# Patient Record
Sex: MALE | Race: WHITE | Employment: FULL TIME | URBAN - METROPOLITAN AREA
[De-identification: names, ages, dates, MRNs, and addresses within clinical notes are randomized per-mention and may not be internally consistent; named-entity substitution may affect disease eponyms.]

---

## 2019-11-12 ENCOUNTER — APPOINTMENT (OUTPATIENT)
Dept: CT IMAGING | Age: 44
DRG: 871 | End: 2019-11-12
Attending: EMERGENCY MEDICINE
Payer: COMMERCIAL

## 2019-11-12 ENCOUNTER — APPOINTMENT (OUTPATIENT)
Dept: GENERAL RADIOLOGY | Age: 44
DRG: 871 | End: 2019-11-12
Attending: EMERGENCY MEDICINE
Payer: COMMERCIAL

## 2019-11-12 ENCOUNTER — HOSPITAL ENCOUNTER (INPATIENT)
Age: 44
LOS: 4 days | Discharge: HOME | DRG: 871 | End: 2019-11-16
Attending: EMERGENCY MEDICINE | Admitting: FAMILY MEDICINE
Payer: COMMERCIAL

## 2019-11-12 DIAGNOSIS — A41.9 SEPSIS, DUE TO UNSPECIFIED ORGANISM, UNSPECIFIED WHETHER ACUTE ORGAN DYSFUNCTION PRESENT (HCC): ICD-10-CM

## 2019-11-12 DIAGNOSIS — J96.01 ACUTE RESPIRATORY FAILURE WITH HYPOXIA AND HYPERCAPNIA (HCC): Primary | ICD-10-CM

## 2019-11-12 DIAGNOSIS — K56.601 COMPLETE INTESTINAL OBSTRUCTION, UNSPECIFIED CAUSE (HCC): ICD-10-CM

## 2019-11-12 DIAGNOSIS — J96.02 ACUTE RESPIRATORY FAILURE WITH HYPOXIA AND HYPERCAPNIA (HCC): Primary | ICD-10-CM

## 2019-11-12 PROBLEM — K56.609 SMALL BOWEL OBSTRUCTION (HCC): Status: ACTIVE | Noted: 2019-11-12

## 2019-11-12 LAB
ALBUMIN SERPL-MCNC: 3.9 G/DL (ref 3.5–5)
ALBUMIN/GLOB SERPL: 1.1 {RATIO} (ref 1.1–2.2)
ALP SERPL-CCNC: 78 U/L (ref 45–117)
ALT SERPL-CCNC: 27 U/L (ref 12–78)
AMORPH CRY URNS QL MICRO: ABNORMAL
AMPHET UR QL SCN: POSITIVE
ANION GAP SERPL CALC-SCNC: 12 MMOL/L (ref 5–15)
ANION GAP SERPL CALC-SCNC: 3 MMOL/L (ref 5–15)
APPEARANCE UR: ABNORMAL
ARTERIAL PATENCY WRIST A: ABNORMAL
ARTERIAL PATENCY WRIST A: YES
AST SERPL-CCNC: 22 U/L (ref 15–37)
ATRIAL RATE: 78 BPM
BACTERIA URNS QL MICRO: NEGATIVE /HPF
BARBITURATES UR QL SCN: NEGATIVE
BASE DEFICIT BLDA-SCNC: 10.9 MMOL/L
BASE DEFICIT BLDA-SCNC: 14.3 MMOL/L
BASE DEFICIT BLDA-SCNC: 7 MMOL/L
BASE DEFICIT BLDA-SCNC: 7.8 MMOL/L
BASE DEFICIT BLDA-SCNC: 8.1 MMOL/L
BASOPHILS # BLD: 0.1 K/UL (ref 0–0.1)
BASOPHILS NFR BLD: 1 % (ref 0–1)
BDY SITE: ABNORMAL
BENZODIAZ UR QL: NEGATIVE
BILIRUB SERPL-MCNC: 0.4 MG/DL (ref 0.2–1)
BILIRUB UR QL: NEGATIVE
BUN BLD-MCNC: 20 MG/DL (ref 9–20)
BUN SERPL-MCNC: 16 MG/DL (ref 6–20)
BUN SERPL-MCNC: 16 MG/DL (ref 6–20)
BUN/CREAT SERPL: 10 (ref 12–20)
BUN/CREAT SERPL: 18 (ref 12–20)
CA-I BLD-MCNC: 1.22 MMOL/L (ref 1.12–1.32)
CALCIUM SERPL-MCNC: 6.9 MG/DL (ref 8.5–10.1)
CALCIUM SERPL-MCNC: 8.5 MG/DL (ref 8.5–10.1)
CALCULATED P AXIS, ECG09: 84 DEGREES
CALCULATED R AXIS, ECG10: 41 DEGREES
CALCULATED T AXIS, ECG11: 39 DEGREES
CANNABINOIDS UR QL SCN: POSITIVE
CHLORIDE BLD-SCNC: 99 MMOL/L (ref 98–107)
CHLORIDE SERPL-SCNC: 102 MMOL/L (ref 97–108)
CHLORIDE SERPL-SCNC: 115 MMOL/L (ref 97–108)
CO2 SERPL-SCNC: 22 MMOL/L (ref 21–32)
CO2 SERPL-SCNC: 24 MMOL/L (ref 21–32)
COCAINE UR QL SCN: POSITIVE
COLOR UR: ABNORMAL
COMMENT, HOLDF: NORMAL
CREAT BLD-MCNC: 1.4 MG/DL (ref 0.6–1.3)
CREAT SERPL-MCNC: 0.9 MG/DL (ref 0.7–1.3)
CREAT SERPL-MCNC: 1.64 MG/DL (ref 0.7–1.3)
D DIMER PPP FEU-MCNC: 0.33 MG/L FEU (ref 0–0.65)
DIAGNOSIS, 93000: NORMAL
DIFFERENTIAL METHOD BLD: ABNORMAL
DRUG SCRN COMMENT,DRGCM: ABNORMAL
EOSINOPHIL # BLD: 0.2 K/UL (ref 0–0.4)
EOSINOPHIL NFR BLD: 1 % (ref 0–7)
EPAP/CPAP/PEEP, PAPEEP: 5
EPAP/CPAP/PEEP, PAPEEP: 5
EPAP/CPAP/PEEP, PAPEEP: 8
EPITH CASTS URNS QL MICRO: ABNORMAL /LPF
ERYTHROCYTE [DISTWIDTH] IN BLOOD BY AUTOMATED COUNT: 13.2 % (ref 11.5–14.5)
EST. AVERAGE GLUCOSE BLD GHB EST-MCNC: 108 MG/DL
ETHANOL SERPL-MCNC: <10 MG/DL
FIO2 ON VENT: 100 %
FIO2 ON VENT: 100 %
FIO2 ON VENT: 50 %
GAS FLOW.O2 SETTING OXYMISER: 16 L/MIN
GAS FLOW.O2 SETTING OXYMISER: 16 L/MIN
GAS FLOW.O2 SETTING OXYMISER: 26 L/MIN
GLOBULIN SER CALC-MCNC: 3.7 G/DL (ref 2–4)
GLUCOSE BLD STRIP.AUTO-MCNC: 109 MG/DL (ref 65–100)
GLUCOSE BLD STRIP.AUTO-MCNC: 119 MG/DL (ref 65–100)
GLUCOSE BLD STRIP.AUTO-MCNC: 121 MG/DL (ref 65–100)
GLUCOSE BLD-MCNC: 484 MG/DL (ref 65–100)
GLUCOSE SERPL-MCNC: 105 MG/DL (ref 65–100)
GLUCOSE SERPL-MCNC: 476 MG/DL (ref 65–100)
GLUCOSE UR STRIP.AUTO-MCNC: >1000 MG/DL
HBA1C MFR BLD: 5.4 % (ref 4.2–6.3)
HCO3 BLDA-SCNC: 19 MMOL/L (ref 22–26)
HCO3 BLDA-SCNC: 20 MMOL/L (ref 22–26)
HCO3 BLDA-SCNC: 20 MMOL/L (ref 22–26)
HCO3 BLDA-SCNC: 21 MMOL/L (ref 22–26)
HCO3 BLDA-SCNC: 22 MMOL/L (ref 22–26)
HCT VFR BLD AUTO: 47.8 % (ref 36.6–50.3)
HCT VFR BLD CALC: 48 % (ref 36.6–50.3)
HGB BLD-MCNC: 15 G/DL (ref 12.1–17)
HGB UR QL STRIP: ABNORMAL
HYALINE CASTS URNS QL MICRO: ABNORMAL /LPF (ref 0–5)
IMM GRANULOCYTES # BLD AUTO: 0.1 K/UL (ref 0–0.04)
IMM GRANULOCYTES NFR BLD AUTO: 1 % (ref 0–0.5)
KETONES UR QL STRIP.AUTO: NEGATIVE MG/DL
LACTATE BLD-SCNC: 1.99 MMOL/L (ref 0.4–2)
LACTATE BLD-SCNC: 5.24 MMOL/L (ref 0.4–2)
LEUKOCYTE ESTERASE UR QL STRIP.AUTO: NEGATIVE
LYMPHOCYTES # BLD: 6 K/UL (ref 0.8–3.5)
LYMPHOCYTES NFR BLD: 35 % (ref 12–49)
MAGNESIUM SERPL-MCNC: 2.2 MG/DL (ref 1.6–2.4)
MAGNESIUM SERPL-MCNC: 2.8 MG/DL (ref 1.6–2.4)
MCH RBC QN AUTO: 31.3 PG (ref 26–34)
MCHC RBC AUTO-ENTMCNC: 31.4 G/DL (ref 30–36.5)
MCV RBC AUTO: 99.6 FL (ref 80–99)
METHADONE UR QL: NEGATIVE
MONOCYTES # BLD: 1.2 K/UL (ref 0–1)
MONOCYTES NFR BLD: 7 % (ref 5–13)
NEUTS SEG # BLD: 9.5 K/UL (ref 1.8–8)
NEUTS SEG NFR BLD: 55 % (ref 32–75)
NITRITE UR QL STRIP.AUTO: NEGATIVE
NRBC # BLD: 0 K/UL (ref 0–0.01)
NRBC BLD-RTO: 0 PER 100 WBC
OPIATES UR QL: NEGATIVE
P-R INTERVAL, ECG05: 146 MS
PCO2 BLDA: 44 MMHG (ref 35–45)
PCO2 BLDA: 47 MMHG (ref 35–45)
PCO2 BLDA: 53 MMHG (ref 35–45)
PCO2 BLDA: 82 MMHG (ref 35–45)
PCO2 BLDA: 99 MMHG (ref 35–45)
PCP UR QL: NEGATIVE
PH BLDA: 6.93 [PH] (ref 7.35–7.45)
PH BLDA: 7.04 [PH] (ref 7.35–7.45)
PH BLDA: 7.21 [PH] (ref 7.35–7.45)
PH BLDA: 7.24 [PH] (ref 7.35–7.45)
PH BLDA: 7.27 [PH] (ref 7.35–7.45)
PH UR STRIP: 5.5 [PH] (ref 5–8)
PHOSPHATE SERPL-MCNC: 2.6 MG/DL (ref 2.6–4.7)
PLATELET # BLD AUTO: 423 K/UL (ref 150–400)
PMV BLD AUTO: 9.8 FL (ref 8.9–12.9)
PO2 BLDA: 110 MMHG (ref 80–100)
PO2 BLDA: 139 MMHG (ref 80–100)
PO2 BLDA: 67 MMHG (ref 80–100)
PO2 BLDA: 82 MMHG (ref 80–100)
PO2 BLDA: 90 MMHG (ref 80–100)
POTASSIUM BLD-SCNC: 3.7 MMOL/L (ref 3.5–5.1)
POTASSIUM SERPL-SCNC: 3.7 MMOL/L (ref 3.5–5.1)
POTASSIUM SERPL-SCNC: 4 MMOL/L (ref 3.5–5.1)
PRESSURE SUPPORT SETTING VENT: 5 CM[H2O]
PRESSURE SUPPORT SETTING VENT: 5 CM[H2O]
PROCALCITONIN SERPL-MCNC: <0.1 NG/ML
PROT SERPL-MCNC: 7.6 G/DL (ref 6.4–8.2)
PROT UR STRIP-MCNC: 100 MG/DL
Q-T INTERVAL, ECG07: 374 MS
QRS DURATION, ECG06: 94 MS
QTC CALCULATION (BEZET), ECG08: 426 MS
RBC # BLD AUTO: 4.8 M/UL (ref 4.1–5.7)
RBC #/AREA URNS HPF: ABNORMAL /HPF (ref 0–5)
SAMPLES BEING HELD,HOLD: NORMAL
SAO2 % BLD: 86 % (ref 92–97)
SAO2 % BLD: 89 % (ref 92–97)
SAO2 % BLD: 92 % (ref 92–97)
SAO2 % BLD: 97 % (ref 92–97)
SAO2 % BLD: 98 % (ref 92–97)
SAO2% DEVICE SAO2% SENSOR NAME: ABNORMAL
SERVICE CMNT-IMP: ABNORMAL
SODIUM BLD-SCNC: 137 MMOL/L (ref 136–145)
SODIUM SERPL-SCNC: 136 MMOL/L (ref 136–145)
SODIUM SERPL-SCNC: 142 MMOL/L (ref 136–145)
SP GR UR REFRACTOMETRY: 1.02 (ref 1–1.03)
SPECIMEN SITE: ABNORMAL
SPERM URNS QL MICRO: PRESENT
TROPONIN I SERPL-MCNC: <0.05 NG/ML
UR CULT HOLD, URHOLD: NORMAL
UROBILINOGEN UR QL STRIP.AUTO: 0.2 EU/DL (ref 0.2–1)
VENTILATION MODE VENT: ABNORMAL
VENTRICULAR RATE, ECG03: 78 BPM
VT SETTING VENT: 450 ML
WBC # BLD AUTO: 17.1 K/UL (ref 4.1–11.1)
WBC URNS QL MICRO: ABNORMAL /HPF (ref 0–4)

## 2019-11-12 PROCEDURE — 84484 ASSAY OF TROPONIN QUANT: CPT

## 2019-11-12 PROCEDURE — 80053 COMPREHEN METABOLIC PANEL: CPT

## 2019-11-12 PROCEDURE — 96368 THER/DIAG CONCURRENT INF: CPT

## 2019-11-12 PROCEDURE — 80307 DRUG TEST PRSMV CHEM ANLYZR: CPT

## 2019-11-12 PROCEDURE — 80047 BASIC METABLC PNL IONIZED CA: CPT

## 2019-11-12 PROCEDURE — 74011250636 HC RX REV CODE- 250/636: Performed by: FAMILY MEDICINE

## 2019-11-12 PROCEDURE — 94640 AIRWAY INHALATION TREATMENT: CPT

## 2019-11-12 PROCEDURE — 81001 URINALYSIS AUTO W/SCOPE: CPT

## 2019-11-12 PROCEDURE — 65610000006 HC RM INTENSIVE CARE

## 2019-11-12 PROCEDURE — 96365 THER/PROPH/DIAG IV INF INIT: CPT

## 2019-11-12 PROCEDURE — 83735 ASSAY OF MAGNESIUM: CPT

## 2019-11-12 PROCEDURE — 74011250636 HC RX REV CODE- 250/636

## 2019-11-12 PROCEDURE — 94760 N-INVAS EAR/PLS OXIMETRY 1: CPT

## 2019-11-12 PROCEDURE — 94002 VENT MGMT INPAT INIT DAY: CPT

## 2019-11-12 PROCEDURE — 74011250637 HC RX REV CODE- 250/637: Performed by: INTERNAL MEDICINE

## 2019-11-12 PROCEDURE — 71260 CT THORAX DX C+: CPT

## 2019-11-12 PROCEDURE — 82803 BLOOD GASES ANY COMBINATION: CPT

## 2019-11-12 PROCEDURE — 74011250636 HC RX REV CODE- 250/636: Performed by: INTERNAL MEDICINE

## 2019-11-12 PROCEDURE — 85025 COMPLETE CBC W/AUTO DIFF WBC: CPT

## 2019-11-12 PROCEDURE — 77010033678 HC OXYGEN DAILY

## 2019-11-12 PROCEDURE — 70450 CT HEAD/BRAIN W/O DYE: CPT

## 2019-11-12 PROCEDURE — 84145 PROCALCITONIN (PCT): CPT

## 2019-11-12 PROCEDURE — 83036 HEMOGLOBIN GLYCOSYLATED A1C: CPT

## 2019-11-12 PROCEDURE — 74011250636 HC RX REV CODE- 250/636: Performed by: EMERGENCY MEDICINE

## 2019-11-12 PROCEDURE — 96375 TX/PRO/DX INJ NEW DRUG ADDON: CPT

## 2019-11-12 PROCEDURE — 82962 GLUCOSE BLOOD TEST: CPT

## 2019-11-12 PROCEDURE — 71045 X-RAY EXAM CHEST 1 VIEW: CPT

## 2019-11-12 PROCEDURE — 74011636320 HC RX REV CODE- 636/320: Performed by: RADIOLOGY

## 2019-11-12 PROCEDURE — 93005 ELECTROCARDIOGRAM TRACING: CPT

## 2019-11-12 PROCEDURE — 43753 TX GASTRO INTUB W/ASP: CPT

## 2019-11-12 PROCEDURE — 31500 INSERT EMERGENCY AIRWAY: CPT

## 2019-11-12 PROCEDURE — 96366 THER/PROPH/DIAG IV INF ADDON: CPT

## 2019-11-12 PROCEDURE — 74011000250 HC RX REV CODE- 250: Performed by: INTERNAL MEDICINE

## 2019-11-12 PROCEDURE — 99285 EMERGENCY DEPT VISIT HI MDM: CPT

## 2019-11-12 PROCEDURE — 87040 BLOOD CULTURE FOR BACTERIA: CPT

## 2019-11-12 PROCEDURE — 94003 VENT MGMT INPAT SUBQ DAY: CPT

## 2019-11-12 PROCEDURE — 77030013140 HC MSK NEB VYRM -A

## 2019-11-12 PROCEDURE — 84100 ASSAY OF PHOSPHORUS: CPT

## 2019-11-12 PROCEDURE — 5A1935Z RESPIRATORY VENTILATION, LESS THAN 24 CONSECUTIVE HOURS: ICD-10-PCS | Performed by: EMERGENCY MEDICINE

## 2019-11-12 PROCEDURE — 36600 WITHDRAWAL OF ARTERIAL BLOOD: CPT

## 2019-11-12 PROCEDURE — 51702 INSERT TEMP BLADDER CATH: CPT

## 2019-11-12 PROCEDURE — 36415 COLL VENOUS BLD VENIPUNCTURE: CPT

## 2019-11-12 PROCEDURE — 74011000250 HC RX REV CODE- 250: Performed by: EMERGENCY MEDICINE

## 2019-11-12 PROCEDURE — 0BH17EZ INSERTION OF ENDOTRACHEAL AIRWAY INTO TRACHEA, VIA NATURAL OR ARTIFICIAL OPENING: ICD-10-PCS | Performed by: EMERGENCY MEDICINE

## 2019-11-12 PROCEDURE — 83605 ASSAY OF LACTIC ACID: CPT

## 2019-11-12 PROCEDURE — 85379 FIBRIN DEGRADATION QUANT: CPT

## 2019-11-12 PROCEDURE — 74011000258 HC RX REV CODE- 258: Performed by: FAMILY MEDICINE

## 2019-11-12 RX ORDER — INSULIN LISPRO 100 [IU]/ML
INJECTION, SOLUTION INTRAVENOUS; SUBCUTANEOUS
Status: DISCONTINUED | OUTPATIENT
Start: 2019-11-12 | End: 2019-11-12

## 2019-11-12 RX ORDER — IPRATROPIUM BROMIDE AND ALBUTEROL SULFATE 2.5; .5 MG/3ML; MG/3ML
SOLUTION RESPIRATORY (INHALATION)
Status: DISCONTINUED
Start: 2019-11-12 | End: 2019-11-12

## 2019-11-12 RX ORDER — FENTANYL CITRATE 50 UG/ML
50 INJECTION, SOLUTION INTRAMUSCULAR; INTRAVENOUS
Status: COMPLETED | OUTPATIENT
Start: 2019-11-12 | End: 2019-11-12

## 2019-11-12 RX ORDER — PROPOFOL 10 MG/ML
0-50 VIAL (ML) INTRAVENOUS
Status: DISCONTINUED | OUTPATIENT
Start: 2019-11-12 | End: 2019-11-13

## 2019-11-12 RX ORDER — ESCITALOPRAM OXALATE 20 MG/1
20 TABLET ORAL DAILY
COMMUNITY

## 2019-11-12 RX ORDER — ENOXAPARIN SODIUM 100 MG/ML
40 INJECTION SUBCUTANEOUS EVERY 24 HOURS
Status: DISCONTINUED | OUTPATIENT
Start: 2019-11-12 | End: 2019-11-16 | Stop reason: HOSPADM

## 2019-11-12 RX ORDER — IPRATROPIUM BROMIDE AND ALBUTEROL SULFATE 2.5; .5 MG/3ML; MG/3ML
3 SOLUTION RESPIRATORY (INHALATION)
Status: DISCONTINUED | OUTPATIENT
Start: 2019-11-12 | End: 2019-11-13

## 2019-11-12 RX ORDER — ONDANSETRON 2 MG/ML
8 INJECTION INTRAMUSCULAR; INTRAVENOUS
Status: COMPLETED | OUTPATIENT
Start: 2019-11-12 | End: 2019-11-12

## 2019-11-12 RX ORDER — SUCCINYLCHOLINE CHLORIDE 20 MG/ML
100 INJECTION INTRAMUSCULAR; INTRAVENOUS
Status: COMPLETED | OUTPATIENT
Start: 2019-11-12 | End: 2019-11-12

## 2019-11-12 RX ORDER — LEVOFLOXACIN 5 MG/ML
750 INJECTION, SOLUTION INTRAVENOUS
Status: COMPLETED | OUTPATIENT
Start: 2019-11-12 | End: 2019-11-12

## 2019-11-12 RX ORDER — SODIUM CHLORIDE 9 MG/ML
125 INJECTION, SOLUTION INTRAVENOUS CONTINUOUS
Status: DISCONTINUED | OUTPATIENT
Start: 2019-11-12 | End: 2019-11-13

## 2019-11-12 RX ORDER — ALBUTEROL SULFATE 0.83 MG/ML
SOLUTION RESPIRATORY (INHALATION)
Status: DISCONTINUED
Start: 2019-11-12 | End: 2019-11-12

## 2019-11-12 RX ORDER — MAGNESIUM SULFATE 100 %
4 CRYSTALS MISCELLANEOUS AS NEEDED
Status: DISCONTINUED | OUTPATIENT
Start: 2019-11-12 | End: 2019-11-13

## 2019-11-12 RX ORDER — DIPHENHYDRAMINE HCL 25 MG
25 TABLET ORAL
COMMUNITY

## 2019-11-12 RX ORDER — DEXTROSE MONOHYDRATE 100 MG/ML
0-250 INJECTION, SOLUTION INTRAVENOUS AS NEEDED
Status: DISCONTINUED | OUTPATIENT
Start: 2019-11-12 | End: 2019-11-13

## 2019-11-12 RX ORDER — QUETIAPINE FUMARATE 25 MG/1
25 TABLET, FILM COATED ORAL
Status: DISCONTINUED | OUTPATIENT
Start: 2019-11-12 | End: 2019-11-14

## 2019-11-12 RX ORDER — MIDAZOLAM HYDROCHLORIDE 1 MG/ML
5 INJECTION, SOLUTION INTRAMUSCULAR; INTRAVENOUS
Status: DISPENSED | OUTPATIENT
Start: 2019-11-12 | End: 2019-11-12

## 2019-11-12 RX ORDER — VANCOMYCIN/0.9 % SOD CHLORIDE 1.5G/250ML
1500 PLASTIC BAG, INJECTION (ML) INTRAVENOUS
Status: DISCONTINUED | OUTPATIENT
Start: 2019-11-12 | End: 2019-11-12

## 2019-11-12 RX ADMIN — FAMOTIDINE 20 MG: 10 INJECTION, SOLUTION INTRAVENOUS at 10:41

## 2019-11-12 RX ADMIN — SODIUM CHLORIDE 1000 ML: 900 INJECTION, SOLUTION INTRAVENOUS at 07:48

## 2019-11-12 RX ADMIN — IPRATROPIUM BROMIDE AND ALBUTEROL SULFATE 3 ML: .5; 3 SOLUTION RESPIRATORY (INHALATION) at 16:37

## 2019-11-12 RX ADMIN — PROPOFOL 50 MCG/KG/MIN: 10 INJECTION, EMULSION INTRAVENOUS at 08:17

## 2019-11-12 RX ADMIN — VANCOMYCIN HYDROCHLORIDE 1250 MG: 1.25 INJECTION, POWDER, LYOPHILIZED, FOR SOLUTION INTRAVENOUS at 07:01

## 2019-11-12 RX ADMIN — QUETIAPINE FUMARATE 25 MG: 25 TABLET ORAL at 18:16

## 2019-11-12 RX ADMIN — FENTANYL CITRATE 50 MCG: 50 INJECTION, SOLUTION INTRAMUSCULAR; INTRAVENOUS at 04:31

## 2019-11-12 RX ADMIN — ONDANSETRON 8 MG: 2 INJECTION INTRAMUSCULAR; INTRAVENOUS at 05:17

## 2019-11-12 RX ADMIN — LEVOFLOXACIN 750 MG: 5 INJECTION, SOLUTION INTRAVENOUS at 05:46

## 2019-11-12 RX ADMIN — METHYLPREDNISOLONE SODIUM SUCCINATE 125 MG: 125 INJECTION, POWDER, FOR SOLUTION INTRAMUSCULAR; INTRAVENOUS at 04:23

## 2019-11-12 RX ADMIN — PROPOFOL 25 MCG/KG/MIN: 10 INJECTION, EMULSION INTRAVENOUS at 04:32

## 2019-11-12 RX ADMIN — SODIUM CHLORIDE 1000 ML: 900 INJECTION, SOLUTION INTRAVENOUS at 04:32

## 2019-11-12 RX ADMIN — SODIUM CHLORIDE 125 ML/HR: 900 INJECTION, SOLUTION INTRAVENOUS at 16:44

## 2019-11-12 RX ADMIN — VANCOMYCIN HYDROCHLORIDE 1000 MG: 1 INJECTION, POWDER, LYOPHILIZED, FOR SOLUTION INTRAVENOUS at 23:19

## 2019-11-12 RX ADMIN — CEFEPIME 2 G: 20 INJECTION, POWDER, FOR SOLUTION INTRAVENOUS at 11:49

## 2019-11-12 RX ADMIN — ALBUTEROL SULFATE 2 DOSE: 2.5 SOLUTION RESPIRATORY (INHALATION) at 04:31

## 2019-11-12 RX ADMIN — VANCOMYCIN HYDROCHLORIDE 1000 MG: 1 INJECTION, POWDER, LYOPHILIZED, FOR SOLUTION INTRAVENOUS at 14:34

## 2019-11-12 RX ADMIN — IPRATROPIUM BROMIDE AND ALBUTEROL SULFATE 3 ML: .5; 3 SOLUTION RESPIRATORY (INHALATION) at 20:10

## 2019-11-12 RX ADMIN — SODIUM CHLORIDE 1000 ML: 900 INJECTION, SOLUTION INTRAVENOUS at 04:31

## 2019-11-12 RX ADMIN — CEFEPIME 2 G: 2 INJECTION, POWDER, FOR SOLUTION INTRAVENOUS at 05:44

## 2019-11-12 RX ADMIN — CEFEPIME 2 G: 20 INJECTION, POWDER, FOR SOLUTION INTRAVENOUS at 20:17

## 2019-11-12 RX ADMIN — FAMOTIDINE 20 MG: 10 INJECTION, SOLUTION INTRAVENOUS at 20:18

## 2019-11-12 RX ADMIN — ENOXAPARIN SODIUM 40 MG: 40 INJECTION SUBCUTANEOUS at 20:18

## 2019-11-12 RX ADMIN — SUCCINYLCHOLINE CHLORIDE 100 MG: 20 INJECTION, SOLUTION INTRAMUSCULAR; INTRAVENOUS; PARENTERAL at 04:12

## 2019-11-12 RX ADMIN — IOPAMIDOL 100 ML: 755 INJECTION, SOLUTION INTRAVENOUS at 05:32

## 2019-11-12 RX ADMIN — IPRATROPIUM BROMIDE AND ALBUTEROL SULFATE 3 ML: .5; 3 SOLUTION RESPIRATORY (INHALATION) at 10:58

## 2019-11-12 RX ADMIN — SODIUM CHLORIDE 125 ML/HR: 900 INJECTION, SOLUTION INTRAVENOUS at 07:45

## 2019-11-12 RX ADMIN — ALBUTEROL SULFATE 2 DOSE: 2.5 SOLUTION RESPIRATORY (INHALATION) at 04:57

## 2019-11-12 NOTE — PROGRESS NOTES
Daily Progress Note: 11/12/2019  Rc Mars MD    Assessment/Plan:   1. Sepsis       - on Cefepime and continue Vancomycin       - check blood cultures and adjust antibiotics accordingly     2. AMS - ? Due to drugs - see UDS       -  Continue neuro checks; currently sedated on Propofol on vent     3. Small bowel obstruction       -  Orogastric tube to wall suction       - NPO/ IVFs       - consulted general surgery     4. Acute respiratory failure with acute uncompensated primary respiratory/ metabolic acidosis       -  Continue ventilator management;  intensivist/ pulmonologist managing      5. Severe metabolic acidosis       - plan as above; ordered IV fluid bolus and maintenance IV infusion     6.  Leukocytosis       -  Repeat CBC     7. Hypothermia       - jaya hugger for external warming and temperature monitoring     8. New onset type 2 diabetes mellitus uncontrolled - with nonketotic hyperosmolar diabetic syndrome       -  Order insulin IV infusion with hourly glucose checks per protocol     9. Lactic acidosis       - continue IV fluid resuscitation     10. Cocaine / Meth/ THC/ polysubstance abuse         - would encourage drug cessation     11. Asthma exacerbation        -  Solumedrol IV and Duoneb treatments per Pul     VTE prophylaxis      - SCDs to BLEs     Problem List:  Problem List as of 11/12/2019 Date Reviewed: 9/3/2014          Codes Class Noted - Resolved    Small bowel obstruction ([facility] [address] ICD-10-CM: Q14.029  ICD-9-CM: 560.9  11/12/2019 - Present        Sepsis ([facility] [address] ICD-10-CM: A41.9  ICD-9-CM: 038.9, 995.91  11/12/2019 - Present              Subjective:     53 y.o. male with past medical history of ADD, Asthma, depression presented to the ED via EMS with reported AMS (altered mental status). Patient is already ETT intubated and was placed on mechanical ventilator support on arrival to the ED this morning.   As such, history was obtained per my review of ED and electronic medical records. Later some additional history was obtained from patient's next of kin- his mother. EMS was reportedly called by patient's girlfriend as she heard patient moaning. Patient reportedly was found on the floor unresponsive. Patient was noted to have coffee ground emesis in his mouth. Patient was reportedly given Ketamine per EMS. He was initially on a 100% non-re breather and then was endotracheally intubated on admission to the ED. Patient is now seen for admission for further evaluation and treatments. Per discussion with patient's mother, she saw the patient yesterday at which time he had no complaints. (Dr Gracia Broussard)    : Intubated and sedated. Discussed with pts parents and they report he did not seem depressed yesterday. Urine Drug screen with cocaine, meth and THC. Review of Systems:   A comprehensive review of systems was negative except for that written in the HPI. Objective:   Physical Exam:     Visit Vitals  /63   Pulse 81   Temp 98.4 °F (36.9 °C)   Resp 11   Ht 5' 10\" (1.778 m)   Wt 70.3 kg (155 lb)   SpO2 96%   BMI 22.24 kg/m²    O2 Flow Rate (L/min): 6 l/min O2 Device: Nasal cannula    Temp (24hrs), Av.7 °F (35.9 °C), Min:94.5 °F (34.7 °C), Max:98.4 °F (36.9 °C)     07 - 1900  In: 7049 [I.V.:1181]  Out: 478 [NYPUS:823]   11/10 1901 -  0700  In: 1000 [I.V.:1000]  Out: -     General:  Intubated/sedated, appears stated age. Head:  Normocephalic, without obvious abnormality, atraumatic. Eyes:  Conjunctivae/corneas clear. EOMs appear intact. Neck: Supple, symmetrical, trachea midline, no adenopathy, thyroid: no enlargement/tenderness/nodules, no carotid bruit and no JVD. Lungs:   rhonchi bilaterally. Chest wall:  No tenderness or deformity. Heart:  Regular rate and rhythm, S1, S2 normal, no murmur, click, rub or gallop. Abdomen:   Soft, non-tender, mildly distended. Bowel sounds normal. No masses,  No organomegaly. Extremities: no cyanosis or edema. No calf tenderness or cords. Pulses: 2+ and symmetric all extremities. Skin: Skin color, texture, turgor normal. No rashes    Neurologic: Intubated and sedated. Data Review:       Recent Days:  Recent Labs     11/12/19  0419   WBC 17.1*   HGB 15.0   HCT 47.8   *     Recent Labs     11/12/19  0903 11/12/19  0419    136   K 4.0 3.7   * 102   CO2 24 22   * 476*   BUN 16 16   CREA 0.90 1.64*   CA 6.9* 8.5   MG 2.2 2.8*   PHOS 2.6  --    ALB  --  3.9   TBILI  --  0.4   SGOT  --  22   ALT  --  27     Recent Labs     11/12/19  1232 11/12/19  1110 11/12/19  0645   PH 7.27* 7.24* 7.21*   PCO2 44 47* 53*   PO2 139* 110* 67*   HCO3 20* 19* 21*   FIO2 50 50 50       24 Hour Results:  Recent Results (from the past 24 hour(s))   POC CHEM8    Collection Time: 11/12/19  4:14 AM   Result Value Ref Range    Calcium, ionized (POC) 1.22 1.12 - 1.32 mmol/L    Sodium (POC) 137 136 - 145 mmol/L    Potassium (POC) 3.7 3.5 - 5.1 mmol/L    Chloride (POC) 99 98 - 107 mmol/L    Glucose (POC) 484 (H) 65 - 100 mg/dL    BUN (POC) 20 9 - 20 mg/dL    Creatinine (POC) 1.4 (H) 0.6 - 1.3 mg/dL    GFRAA, POC >60 >60 ml/min/1.73m2    GFRNA, POC 55 (L) >60 ml/min/1.73m2    Hematocrit (POC) 48 36.6 - 50.3 %    Comment Comment Not Indicated. CBC WITH AUTOMATED DIFF    Collection Time: 11/12/19  4:19 AM   Result Value Ref Range    WBC 17.1 (H) 4.1 - 11.1 K/uL    RBC 4.80 4. 10 - 5.70 M/uL    HGB 15.0 12.1 - 17.0 g/dL    HCT 47.8 36.6 - 50.3 %    MCV 99.6 (H) 80.0 - 99.0 FL    MCH 31.3 26.0 - 34.0 PG    MCHC 31.4 30.0 - 36.5 g/dL    RDW 13.2 11.5 - 14.5 %    PLATELET 731 (H) 348 - 400 K/uL    MPV 9.8 8.9 - 12.9 FL    NRBC 0.0 0  WBC    ABSOLUTE NRBC 0.00 0.00 - 0.01 K/uL    NEUTROPHILS 55 32 - 75 %    LYMPHOCYTES 35 12 - 49 %    MONOCYTES 7 5 - 13 %    EOSINOPHILS 1 0 - 7 %    BASOPHILS 1 0 - 1 %    IMMATURE GRANULOCYTES 1 (H) 0.0 - 0.5 %    ABS.  NEUTROPHILS 9.5 (H) 1.8 - 8.0 K/UL    ABS. LYMPHOCYTES 6.0 (H) 0.8 - 3.5 K/UL    ABS. MONOCYTES 1.2 (H) 0.0 - 1.0 K/UL    ABS. EOSINOPHILS 0.2 0.0 - 0.4 K/UL    ABS. BASOPHILS 0.1 0.0 - 0.1 K/UL    ABS. IMM. GRANS. 0.1 (H) 0.00 - 0.04 K/UL    DF AUTOMATED     METABOLIC PANEL, COMPREHENSIVE    Collection Time: 11/12/19  4:19 AM   Result Value Ref Range    Sodium 136 136 - 145 mmol/L    Potassium 3.7 3.5 - 5.1 mmol/L    Chloride 102 97 - 108 mmol/L    CO2 22 21 - 32 mmol/L    Anion gap 12 5 - 15 mmol/L    Glucose 476 (H) 65 - 100 mg/dL    BUN 16 6 - 20 MG/DL    Creatinine 1.64 (H) 0.70 - 1.30 MG/DL    BUN/Creatinine ratio 10 (L) 12 - 20      GFR est AA 55 (L) >60 ml/min/1.73m2    GFR est non-AA 45 (L) >60 ml/min/1.73m2    Calcium 8.5 8.5 - 10.1 MG/DL    Bilirubin, total 0.4 0.2 - 1.0 MG/DL    ALT (SGPT) 27 12 - 78 U/L    AST (SGOT) 22 15 - 37 U/L    Alk. phosphatase 78 45 - 117 U/L    Protein, total 7.6 6.4 - 8.2 g/dL    Albumin 3.9 3.5 - 5.0 g/dL    Globulin 3.7 2.0 - 4.0 g/dL    A-G Ratio 1.1 1.1 - 2.2     TROPONIN I    Collection Time: 11/12/19  4:19 AM   Result Value Ref Range    Troponin-I, Qt. <0.05 <0.05 ng/mL   D DIMER    Collection Time: 11/12/19  4:19 AM   Result Value Ref Range    D-dimer 0.33 0.00 - 0.65 mg/L FEU   SAMPLES BEING HELD    Collection Time: 11/12/19  4:19 AM   Result Value Ref Range    SAMPLES BEING HELD 1RED,1SST     COMMENT        Add-on orders for these samples will be processed based on acceptable specimen integrity and analyte stability, which may vary by analyte.    ETHYL ALCOHOL    Collection Time: 11/12/19  4:19 AM   Result Value Ref Range    ALCOHOL(ETHYL),SERUM <10 <10 MG/DL   MAGNESIUM    Collection Time: 11/12/19  4:19 AM   Result Value Ref Range    Magnesium 2.8 (H) 1.6 - 2.4 mg/dL   PROCALCITONIN    Collection Time: 11/12/19  4:19 AM   Result Value Ref Range    Procalcitonin <0.1 ng/mL   DRUG SCREEN, URINE    Collection Time: 11/12/19  4:24 AM   Result Value Ref Range    AMPHETAMINES POSITIVE (A) NEG BARBITURATES NEGATIVE  NEG      BENZODIAZEPINES NEGATIVE  NEG      COCAINE POSITIVE (A) NEG      METHADONE NEGATIVE  NEG      OPIATES NEGATIVE  NEG      PCP(PHENCYCLIDINE) NEGATIVE  NEG      THC (TH-CANNABINOL) POSITIVE (A) NEG      Drug screen comment (NOTE)    POC LACTIC ACID    Collection Time: 11/12/19  4:25 AM   Result Value Ref Range    Lactic Acid (POC) 5.24 (HH) 0.40 - 2.00 mmol/L   EKG, 12 LEAD, INITIAL    Collection Time: 11/12/19  4:27 AM   Result Value Ref Range    Ventricular Rate 78 BPM    Atrial Rate 78 BPM    P-R Interval 146 ms    QRS Duration 94 ms    Q-T Interval 374 ms    QTC Calculation (Bezet) 426 ms    Calculated P Axis 84 degrees    Calculated R Axis 41 degrees    Calculated T Axis 39 degrees    Diagnosis       Normal sinus rhythm  Normal ECG  No previous ECGs available  Confirmed by Giovanny Mendoza M.D., Cherise Dai ([id number]) on 11/12/2019 12:17:50 PM     BLOOD GAS, ARTERIAL    Collection Time: 11/12/19  4:31 AM   Result Value Ref Range    pH 6.93 (LL) 7.35 - 7.45      PCO2 99 (H) 35.0 - 45.0 mmHg    PO2 82 80 - 100 mmHg    O2 SAT 86 (L) 92 - 97 %    BICARBONATE 20 (L) 22 - 26 mmol/L    BASE DEFICIT 14.3 mmol/L    O2 METHOD VENTILATOR      FIO2 100 %    MODE Pressure Release Ventilation      Tidal volume 450      SET RATE 16      EPAP/CPAP/PEEP 8.0      Sample source ARTERIAL      SITE RIGHT RADIAL      STEPHANIA'S TEST N/A      Critical value read back SUZANNE ROGERS MD    URINALYSIS W/MICROSCOPIC    Collection Time: 11/12/19  4:38 AM   Result Value Ref Range    Color YELLOW/STRAW      Appearance CLOUDY (A) CLEAR      Specific gravity 1.024 1.003 - 1.030      pH (UA) 5.5 5.0 - 8.0      Protein 100 (A) NEG mg/dL    Glucose >1,000 (A) NEG mg/dL    Ketone NEGATIVE  NEG mg/dL    Bilirubin NEGATIVE  NEG      Blood MODERATE (A) NEG      Urobilinogen 0.2 0.2 - 1.0 EU/dL    Nitrites NEGATIVE  NEG      Leukocyte Esterase NEGATIVE  NEG      WBC 10-20 0 - 4 /hpf    RBC 0-5 0 - 5 /hpf    Epithelial cells FEW FEW /lpf    Bacteria NEGATIVE  NEG /hpf    Amorphous Crystals FEW (A) NEG      Hyaline cast 10-20 0 - 5 /lpf    Spermatozoa PRESENT     URINE CULTURE HOLD SAMPLE    Collection Time: 11/12/19  4:38 AM   Result Value Ref Range    Urine culture hold        URINE ON HOLD IN MICROBIOLOGY DEPT FOR 3 DAYS. IF UNPRESERVED URINE IS SUBMITTED, IT CANNOT BE USED FOR ADDITIONAL TESTING AFTER 24 HRS, RECOLLECTION WILL BE REQUIRED. BLOOD GAS, ARTERIAL    Collection Time: 11/12/19  4:55 AM   Result Value Ref Range    pH 7.04 (LL) 7.35 - 7.45      PCO2 82 (H) 35.0 - 45.0 mmHg    PO2 90 80 - 100 mmHg    O2 SAT 92 92 - 97 %    BICARBONATE 22 22 - 26 mmol/L    BASE DEFICIT 10.9 mmol/L    O2 METHOD VENTILATOR      FIO2 100 %    MODE Pressure Release Ventilation      Tidal volume 450      SET RATE 26      EPAP/CPAP/PEEP 8.0      Sample source ARTERIAL      SITE RIGHT BRACHIAL      STEPHANIA'S TEST N/A      Critical value read back SUZANNE ROGERS MD    POC LACTIC ACID    Collection Time: 11/12/19  6:42 AM   Result Value Ref Range    Lactic Acid (POC) 1.99 0.40 - 2.00 mmol/L   BLOOD GAS, ARTERIAL    Collection Time: 11/12/19  6:45 AM   Result Value Ref Range    pH 7.21 (LL) 7.35 - 7.45      PCO2 53 (H) 35.0 - 45.0 mmHg    PO2 67 (L) 80 - 100 mmHg    O2 SAT 89 (L) 92 - 97 %    BICARBONATE 21 (L) 22 - 26 mmol/L    BASE DEFICIT 7.8 mmol/L    O2 METHOD VENTILATOR      FIO2 50 %    MODE Pressure Release Ventilation      Tidal volume 450      SET RATE 16      EPAP/CPAP/PEEP 8.0      Sample source ARTERIAL      SITE RIGHT BRACHIAL      STEPHANIA'S TEST N/A      Critical value read back SUZANNE ROGERS MD    GLUCOSE, POC    Collection Time: 11/12/19  7:59 AM   Result Value Ref Range    Glucose (POC) 119 (H) 65 - 100 mg/dL    Performed by [address] [facility] [address] , BASIC    Collection Time: 11/12/19  9:03 AM   Result Value Ref Range    Sodium 142 136 - 145 mmol/L    Potassium 4.0 3.5 - 5.1 mmol/L    Chloride 115 (H) 97 - 108 mmol/L    CO2 24 21 - 32 mmol/L    Anion gap 3 (L) 5 - 15 mmol/L    Glucose 105 (H) 65 - 100 mg/dL    BUN 16 6 - 20 MG/DL    Creatinine 0.90 0.70 - 1.30 MG/DL    BUN/Creatinine ratio 18 12 - 20      GFR est AA >60 >60 ml/min/1.73m2    GFR est non-AA >60 >60 ml/min/1.73m2    Calcium 6.9 (L) 8.5 - 10.1 MG/DL   MAGNESIUM    Collection Time: 11/12/19  9:03 AM   Result Value Ref Range    Magnesium 2.2 1.6 - 2.4 mg/dL   PHOSPHORUS    Collection Time: 11/12/19  9:03 AM   Result Value Ref Range    Phosphorus 2.6 2.6 - 4.7 MG/DL   BLOOD GAS, ARTERIAL    Collection Time: 11/12/19 11:10 AM   Result Value Ref Range    pH 7.24 (LL) 7.35 - 7.45      PCO2 47 (H) 35.0 - 45.0 mmHg    PO2 110 (H) 80 - 100 mmHg    O2 SAT 97 92 - 97 %    BICARBONATE 19 (L) 22 - 26 mmol/L    BASE DEFICIT 8.1 mmol/L    O2 METHOD VENTILATOR      FIO2 50 %    PRESSURE SUPPORT 5.0      EPAP/CPAP/PEEP 5.0      Sample source ARTERIAL      SITE LEFT RADIAL      STEPHANIA'S TEST N/A      Critical value read back Brandee Ortega MD    GLUCOSE, POC    Collection Time: 11/12/19 11:22 AM   Result Value Ref Range    Glucose (POC) 109 (H) 65 - 100 mg/dL    Performed by Baldemar Albrecht    BLOOD GAS, ARTERIAL    Collection Time: 11/12/19 12:32 PM   Result Value Ref Range    pH 7.27 (L) 7.35 - 7.45      PCO2 44 35.0 - 45.0 mmHg    PO2 139 (H) 80 - 100 mmHg    O2 SAT 98 (H) 92 - 97 %    BICARBONATE 20 (L) 22 - 26 mmol/L    BASE DEFICIT 7.0 mmol/L    O2 METHOD VENTILATOR      FIO2 50 %    PRESSURE SUPPORT 5.0      EPAP/CPAP/PEEP 5.0      Sample source ARTERIAL      SITE RIGHT RADIAL      STEPHANIA'S TEST YES         Medications reviewed  Current Facility-Administered Medications   Medication Dose Route Frequency    propofol (DIPRIVAN) infusion  0-50 mcg/kg/min IntraVENous TITRATE    midazolam (VERSED) injection 5 mg  5 mg IntraVENous NOW    0.9% sodium chloride infusion  125 mL/hr IntraVENous CONTINUOUS    glucose chewable tablet 16 g  4 Tab Oral PRN    glucagon (GLUCAGEN) injection 1 mg  1 mg IntraMUSCular PRN    dextrose 10% infusion 0-250 mL  0-250 mL IntraVENous PRN    enoxaparin (LOVENOX) injection 40 mg  40 mg SubCUTAneous Q24H    famotidine (PF) (PEPCID) 20 mg in sodium chloride 0.9% 10 mL injection  20 mg IntraVENous Q12H    albuterol-ipratropium (DUO-NEB) 2.5 MG-0.5 MG/3 ML  3 mL Nebulization Q4H PRN    cefepime (MAXIPIME) 2 g in 0.9% sodium chloride (MBP/ADV) 100 mL  2 g IntraVENous Q8H    vancomycin (VANCOCIN) 1,000 mg in 0.9% sodium chloride (MBP/ADV) 250 mL  1,000 mg IntraVENous Q8H    [START ON 11/13/2019] Vancomycin- Level at 630am on 11/13/19   Other [facility] [employer] discussed with: Patient/Family and Nurse    Total time spent with patient: 30 minutes.     Malissa Dorantes MD

## 2019-11-12 NOTE — CONSULTS
[address]     Name:  Hines Cogan  MR#:  [id number]  :  1975  ACCOUNT #:  [de-identified]  DATE OF SERVICE:  2019      REASON FOR CONSULTATION:  Possible bowel obstruction. CONSULT REQUESTED BY:  Dr. Varun Sow in the emergency room and Dr. Ilya Aguilar.    BRIEF HISTORY:  A 49-year-old gentleman with history of asthma, depression, and ADD, who was stated by his family he had been healthy up until this morning when he was found down for unknown cause. He was given 4 of Narcan with initially no response, but then became combative and was given ketamine, placed on BIPAP machine, intubated in the emergency room. MEDICATIONS:  Documented in his chart. ALLERGIES:  NONE KNOWN. PCP:  Andressa Zhang. Valentino Dates, MD    FAMILY HISTORY:  From his mother:  Arthritis, coronary artery disease, cancer unknown. REVIEW OF SYSTEMS:  Unobtainable, taken from his family. They know he had no recent GI complaints. PHYSICAL EXAMINATION:  HEAD AND NECK:  Sclerae anicteric. Neck is supple. He has no palpable lymphadenopathy. CHEST:  Bilateral extensive rhonchi. HEART:  Tachycardic. No obvious murmur. ABDOMEN:  Soft, slightly distended but not significantly so. It was initially very distended until NG tube was placed. He has no abdominal scars. EXTREMITIES:  Without edema. He has no obvious signs of physical abuse. NEUROLOGIC:  He is unresponsive this time on the ventilator. DIAGNOSTIC STUDIES:  His CAT scan was reviewed, revealed small bowel distended with gas down to distal small bowel. No obvious point of obstruction by my reviewing. NG tube appears to be in appropriate position. Partida in appropriate position. His CAT scan also revealed small left lower lobe pneumonia of unknown duration. LABORATORY DATA:  His labs on admission revealed a white count of 17. His LFTs were normal.  BUN and creatinine slightly elevated.     ASSESSMENT:  Ileus versus proximal small bowel obstruction. We will follow with you while in hospital and make further recommendations based on the patient's clinical course. The patient will be seen by Dr. Johanna Law or Dr. Андрей Rodas from [facility].       Eze Cole MD      [id number]  D:  11/12/2019 7:17  T:  11/12/2019 13:33  JOB #:  [id number]  CC:

## 2019-11-12 NOTE — DIABETES MGMT
[facility]    D[facility] Consult Note    Recommendations/ Comments: Consult noted for new diagnosis. Note pt is intubated and sedated and not appropriate to be seen at this time. . Note insulin drip was ordered but currently being held due to blood sugar of 119 mg/dl. If appropriate, please consider adding A1c to next blood draw. DTC to continue to follow and see when appropriate    Current hospital DM medication: insulin drip was ordered but being held due to blood sugar of 119 mg/dl. Chart reviewed on [facility]. Patient is a 53 y.o. male with a new diagnosis of diabetes. A1c:   No results found for: HBA1C, HGBE8, [id number]    Recent Glucose Results:   Lab Results   Component Value Date/Time     (H) 11/12/2019 09:03 AM     (H) 11/12/2019 04:19 AM    GLUCPOC 119 (H) 11/12/2019 07:59 AM        Lab Results   Component Value Date/Time    Creatinine 0.90 11/12/2019 09:03 AM     Estimated Creatinine Clearance: 102 mL/min (based on SCr of 0.9 mg/dL). Active Orders   Diet    DIET NPO        PO intake: No data found. Will continue to follow as needed.     Thank you    Rohini Montes RD, CDE      Time spent: 10 minutes

## 2019-11-12 NOTE — PROGRESS NOTES
Problem: Non-Violent Restraints  Goal: *Removal from restraints as soon as assessed to be safe  Outcome: Progressing Towards Goal  Goal: *No harm/injury to patient while restraints in use  Outcome: Progressing Towards Goal  Goal: *Patient's dignity will be maintained  Outcome: Progressing Towards Goal  Goal: *Patient Specific Goal (EDIT GOAL, INSERT TEXT)  Outcome: Progressing Towards Goal  Goal: Non-violent Restaints:Standard Interventions  Outcome: Progressing Towards Goal  Goal: Non-violent Restraints:Patient Interventions  Outcome: Progressing Towards Goal  Goal: Patient/Family Education  Outcome: Progressing Towards Goal

## 2019-11-12 NOTE — CONSULTS
[facility]  Pulmonary, Critical Care, and Sleep Medicine    Name: Maynor Self MRN: [id number]   : 1975 Hospital: [address] [facility]   Date: 2019        Critical Care Initial Patient Consult    IMPRESSION:   · AMS:  ? Secondary to overdose. · Acute respiratory failure:  Observed on various settings. Seems to tolerate spont mode even on propofol. · SBO  · + UDS:  + for amphetamine/cocaine/THC  · Asthma  · Depression      RECOMMENDATIONS:   · Will watch on spont mode  · Wean off propofol  · If agitation is too severe and uncontrollable, may need to re-sedate today  · Follow abgs  · Empiric abx, may only need short course if no evidence of pneumonia/sepsis  · Pepcid/lovenox prophylaxis         Subjective/History: This patient has been seen and evaluated at the request of Dr. Samantha Vigil for acute respiratory failure. Patient is a 53 y.o. male who presented to the ED overnight in resp failure. By report, pt was found poorly responsive by EMS. Became combative after receiving narcan; given ketamine and intubated. Pt with + UDS as above. Pt currently intubated and sedated on propofol. Spoke with pt's parents. Mother saw him yesterday and indicated he had no complaints then. Pt has a h/o depression but she did find him depressed or upset yesterday. They are unaware of any drug use.  + smoker; they believe rare etoh    The patient is critically ill and can not provide additional history due to Ventilated. Past Medical History:   Diagnosis Date    ADD (attention deficit disorder)     Asthma     Depression       Past Surgical History:   Procedure Laterality Date    HX ORTHOPAEDIC      Elbow    HX TONSILLECTOMY            Prior to Admission medications    Medication Sig Start Date End Date Taking? Authorizing Provider   escitalopram oxalate (LEXAPRO) 20 mg tablet Take 20 mg by mouth daily.    Yes Provider, Historical   diphenhydrAMINE (BENADRYL) 25 mg tablet Take 25 mg by mouth every six (6) hours as needed for Sleep (allergies). Yes Provider, Historical   albuterol (PROVENTIL HFA, VENTOLIN HFA, PROAIR HFA) 90 mcg/actuation inhaler Take 2 Puffs by inhalation every four (4) hours as needed for Wheezing or Shortness of Breath. 5/12/15  Yes Aisha Clemens,    cetirizine (ZYRTEC) 10 mg tablet Take 10 mg by mouth daily as needed. Yes Provider, Historical     Current Facility-Administered Medications   Medication Dose Route Frequency    propofol (DIPRIVAN) infusion  0-50 mcg/kg/min IntraVENous TITRATE    midazolam (VERSED) injection 5 mg  5 mg IntraVENous NOW    0.9% sodium chloride infusion  125 mL/hr IntraVENous CONTINUOUS    insulin regular (NOVOLIN R, HUMULIN R) 100 Units in 0.9% sodium chloride 100 mL infusion  0-50 Units/hr IntraVENous TITRATE    insulin lispro (HUMALOG) injection   SubCUTAneous TIDAC    cefepime (MAXIPIME) 2 g in 0.9% sodium chloride (MBP/ADV) 100 mL  2 g IntraVENous Q12H    Vancomycin - pharmacy to dose by level   Other Rx Dosing/Monitoring    [START ON 2019] Vancomycin - Random level  @ 0800   Other ONCE     No Known Allergies   Social History     Tobacco Use    Smoking status: Former Smoker     Types: Cigarettes     Last attempt to quit: 2013     Years since quittin.2    Smokeless tobacco: Never Used   Substance Use Topics    Alcohol use: No      Family History   Problem Relation Age of Onset    Arthritis-osteo Maternal Grandmother     Heart Disease Maternal Grandmother         CABG/Stents    Cancer Maternal Grandfather         Unknown        Review of Systems:  Review of systems not obtained due to patient factors.     Objective:   Vital Signs:    Visit Vitals  BP 97/69   Pulse 84   Temp 97.3 °F (36.3 °C)   Resp 20   Ht 5' 10\" (1.778 m)   Wt 70.3 kg (155 lb)   SpO2 98%   BMI 22.24 kg/m²       O2 Device: Ventilator       Temp (24hrs), Av.7 °F (35.4 °C), Min:94.5 °F (34.7 °C), Max:97.3 °F (36.3 °C)       Intake/Output:   Last shift:      11/12 0701 - 11/12 1900  In: 150 [I.V.:150]  Out: -   Last 3 shifts: 11/10 1901 - 11/12 0700  In: 1000 [I.V.:1000]  Out: -     Intake/Output Summary (Last 24 hours) at 11/12/2019 1000  Last data filed at 11/12/2019 0739  Gross per 24 hour   Intake 1150 ml   Output    Net 1150 ml     Hemodynamics:   PAP:   CO:     Wedge:   CI:     CVP:    SVR:       PVR:       Ventilator Settings:  Mode Rate Tidal Volume Pressure FiO2 PEEP   PRVC   450 ml    70 % 8 cm H20     Peak airway pressure: 34 cm H2O    Minute ventilation: 11.7 l/min      Physical Exam:    General:  Intubated and sedated   Head:  Normocephalic, without obvious abnormality, atraumatic. Eyes:  Conjunctivae/corneas clear. PERRL, EOMs intact. Nose: Nares normal. Septum midline. Mucosa normal. No drainage or sinus tenderness. Throat: intubated   Neck: Supple, symmetrical, trachea midline, no adenopathy, thyroid: no enlargment/tenderness/nodules, no carotid bruit and no JVD. Back:   Symmetric, no curvature. ROM normal.   Lungs:   Clear to auscultation bilaterally. Chest wall:  No tenderness or deformity. Heart:  Regular rate and rhythm, S1, S2 normal, no murmur, click, rub or gallop. Abdomen:   Soft, non-tender. Bowel sounds normal. No masses,  No organomegaly. Extremities: Extremities normal, atraumatic, no cyanosis or edema. Pulses: 2+ and symmetric all extremities. Skin: Skin color, texture, turgor normal. No rashes or lesions   Lymph nodes: Cervical, supraclavicular, and axillary nodes normal.   Neurologic: sedated       Data:     Recent Results (from the past 24 hour(s))   CBC WITH AUTOMATED DIFF    Collection Time: 11/12/19  4:19 AM   Result Value Ref Range    WBC 17.1 (H) 4.1 - 11.1 K/uL    RBC 4.80 4. 10 - 5.70 M/uL    HGB 15.0 12.1 - 17.0 g/dL    HCT 47.8 36.6 - 50.3 %    MCV 99.6 (H) 80.0 - 99.0 FL    MCH 31.3 26.0 - 34.0 PG    MCHC 31.4 30.0 - 36.5 g/dL    RDW 13.2 11.5 - 14.5 %    PLATELET 423 (H) 150 - 400 K/uL    MPV 9.8 8.9 - 12.9 FL    NRBC 0.0 0  WBC    ABSOLUTE NRBC 0.00 0.00 - 0.01 K/uL    NEUTROPHILS 55 32 - 75 %    LYMPHOCYTES 35 12 - 49 %    MONOCYTES 7 5 - 13 %    EOSINOPHILS 1 0 - 7 %    BASOPHILS 1 0 - 1 %    IMMATURE GRANULOCYTES 1 (H) 0.0 - 0.5 %    ABS. NEUTROPHILS 9.5 (H) 1.8 - 8.0 K/UL    ABS. LYMPHOCYTES 6.0 (H) 0.8 - 3.5 K/UL    ABS. MONOCYTES 1.2 (H) 0.0 - 1.0 K/UL    ABS. EOSINOPHILS 0.2 0.0 - 0.4 K/UL    ABS. BASOPHILS 0.1 0.0 - 0.1 K/UL    ABS. IMM. GRANS. 0.1 (H) 0.00 - 0.04 K/UL    DF AUTOMATED     METABOLIC PANEL, COMPREHENSIVE    Collection Time: 11/12/19  4:19 AM   Result Value Ref Range    Sodium 136 136 - 145 mmol/L    Potassium 3.7 3.5 - 5.1 mmol/L    Chloride 102 97 - 108 mmol/L    CO2 22 21 - 32 mmol/L    Anion gap 12 5 - 15 mmol/L    Glucose 476 (H) 65 - 100 mg/dL    BUN 16 6 - 20 MG/DL    Creatinine 1.64 (H) 0.70 - 1.30 MG/DL    BUN/Creatinine ratio 10 (L) 12 - 20      GFR est AA 55 (L) >60 ml/min/1.73m2    GFR est non-AA 45 (L) >60 ml/min/1.73m2    Calcium 8.5 8.5 - 10.1 MG/DL    Bilirubin, total 0.4 0.2 - 1.0 MG/DL    ALT (SGPT) 27 12 - 78 U/L    AST (SGOT) 22 15 - 37 U/L    Alk. phosphatase 78 45 - 117 U/L    Protein, total 7.6 6.4 - 8.2 g/dL    Albumin 3.9 3.5 - 5.0 g/dL    Globulin 3.7 2.0 - 4.0 g/dL    A-G Ratio 1.1 1.1 - 2.2     TROPONIN I    Collection Time: 11/12/19  4:19 AM   Result Value Ref Range    Troponin-I, Qt. <0.05 <0.05 ng/mL   D DIMER    Collection Time: 11/12/19  4:19 AM   Result Value Ref Range    D-dimer 0.33 0.00 - 0.65 mg/L FEU   SAMPLES BEING HELD    Collection Time: 11/12/19  4:19 AM   Result Value Ref Range    SAMPLES BEING HELD 1RED,1SST     COMMENT        Add-on orders for these samples will be processed based on acceptable specimen integrity and analyte stability, which may vary by analyte.    ETHYL ALCOHOL    Collection Time: 11/12/19  4:19 AM   Result Value Ref Range    ALCOHOL(ETHYL),SERUM <10 <10 MG/DL   MAGNESIUM Collection Time: 11/12/19  4:19 AM   Result Value Ref Range    Magnesium 2.8 (H) 1.6 - 2.4 mg/dL   PROCALCITONIN    Collection Time: 11/12/19  4:19 AM   Result Value Ref Range    Procalcitonin <0.1 ng/mL   DRUG SCREEN, URINE    Collection Time: 11/12/19  4:24 AM   Result Value Ref Range    AMPHETAMINES POSITIVE (A) NEG      BARBITURATES NEGATIVE  NEG      BENZODIAZEPINES NEGATIVE  NEG      COCAINE POSITIVE (A) NEG      METHADONE NEGATIVE  NEG      OPIATES NEGATIVE  NEG      PCP(PHENCYCLIDINE) NEGATIVE  NEG      THC (TH-CANNABINOL) POSITIVE (A) NEG      Drug screen comment (NOTE)    POC LACTIC ACID    Collection Time: 11/12/19  4:25 AM   Result Value Ref Range    Lactic Acid (POC) 5.24 (HH) 0.40 - 2.00 mmol/L   EKG, 12 LEAD, INITIAL    Collection Time: 11/12/19  4:27 AM   Result Value Ref Range    Ventricular Rate 78 BPM    Atrial Rate 78 BPM    P-R Interval 146 ms    QRS Duration 94 ms    Q-T Interval 374 ms    QTC Calculation (Bezet) 426 ms    Calculated P Axis 84 degrees    Calculated R Axis 41 degrees    Calculated T Axis 39 degrees    Diagnosis       Normal sinus rhythm  Normal ECG  No previous ECGs available     BLOOD GAS, ARTERIAL    Collection Time: 11/12/19  4:31 AM   Result Value Ref Range    pH 6.93 (LL) 7.35 - 7.45      PCO2 99 (H) 35.0 - 45.0 mmHg    PO2 82 80 - 100 mmHg    O2 SAT 86 (L) 92 - 97 %    BICARBONATE 20 (L) 22 - 26 mmol/L    BASE DEFICIT 14.3 mmol/L    O2 METHOD VENTILATOR      FIO2 100 %    MODE Pressure Release Ventilation      Tidal volume 450      SET RATE 16      EPAP/CPAP/PEEP 8.0      Sample source ARTERIAL      SITE RIGHT RADIAL      STEPHANIA'S TEST N/A      Critical value read back SUZANNE ROGERS MD    URINALYSIS W/MICROSCOPIC    Collection Time: 11/12/19  4:38 AM   Result Value Ref Range    Color YELLOW/STRAW      Appearance CLOUDY (A) CLEAR      Specific gravity 1.024 1.003 - 1.030      pH (UA) 5.5 5.0 - 8.0      Protein 100 (A) NEG mg/dL    Glucose >1,000 (A) NEG mg/dL    Ketone NEGATIVE NEG mg/dL    Bilirubin NEGATIVE  NEG      Blood MODERATE (A) NEG      Urobilinogen 0.2 0.2 - 1.0 EU/dL    Nitrites NEGATIVE  NEG      Leukocyte Esterase NEGATIVE  NEG      WBC 10-20 0 - 4 /hpf    RBC 0-5 0 - 5 /hpf    Epithelial cells FEW FEW /lpf    Bacteria NEGATIVE  NEG /hpf    Amorphous Crystals FEW (A) NEG      Hyaline cast 10-20 0 - 5 /lpf    Spermatozoa PRESENT     URINE CULTURE HOLD SAMPLE    Collection Time: 11/12/19  4:38 AM   Result Value Ref Range    Urine culture hold        URINE ON HOLD IN MICROBIOLOGY DEPT FOR 3 DAYS. IF UNPRESERVED URINE IS SUBMITTED, IT CANNOT BE USED FOR ADDITIONAL TESTING AFTER 24 HRS, RECOLLECTION WILL BE REQUIRED. BLOOD GAS, ARTERIAL    Collection Time: 11/12/19  4:55 AM   Result Value Ref Range    pH 7.04 (LL) 7.35 - 7.45      PCO2 82 (H) 35.0 - 45.0 mmHg    PO2 90 80 - 100 mmHg    O2 SAT 92 92 - 97 %    BICARBONATE 22 22 - 26 mmol/L    BASE DEFICIT 10.9 mmol/L    O2 METHOD VENTILATOR      FIO2 100 %    MODE Pressure Release Ventilation      Tidal volume 450      SET RATE 26      EPAP/CPAP/PEEP 8.0      Sample source ARTERIAL      SITE RIGHT BRACHIAL      STEPHANIA'S TEST N/A      Critical value read back SUZANNE ROGERS MD    POC LACTIC ACID    Collection Time: 11/12/19  6:42 AM   Result Value Ref Range    Lactic Acid (POC) 1.99 0.40 - 2.00 mmol/L   BLOOD GAS, ARTERIAL    Collection Time: 11/12/19  6:45 AM   Result Value Ref Range    pH 7.21 (LL) 7.35 - 7.45      PCO2 53 (H) 35.0 - 45.0 mmHg    PO2 67 (L) 80 - 100 mmHg    O2 SAT 89 (L) 92 - 97 %    BICARBONATE 21 (L) 22 - 26 mmol/L    BASE DEFICIT 7.8 mmol/L    O2 METHOD VENTILATOR      FIO2 50 %    MODE Pressure Release Ventilation      Tidal volume 450      SET RATE 16      EPAP/CPAP/PEEP 8.0      Sample source ARTERIAL      SITE RIGHT BRACHIAL      STEPHANIA'S TEST N/A      Critical value read back SUZANNE ROGERS MD    GLUCOSE, POC    Collection Time: 11/12/19  7:59 AM   Result Value Ref Range    Glucose (POC) 119 (H) 65 - 100 mg/dL Performed by Audelia Alfaro    METABOLIC PANEL, BASIC    Collection Time: 11/12/19  9:03 AM   Result Value Ref Range    Sodium 142 136 - 145 mmol/L    Potassium 4.0 3.5 - 5.1 mmol/L    Chloride 115 (H) 97 - 108 mmol/L    CO2 24 21 - 32 mmol/L    Anion gap 3 (L) 5 - 15 mmol/L    Glucose 105 (H) 65 - 100 mg/dL    BUN 16 6 - 20 MG/DL    Creatinine 0.90 0.70 - 1.30 MG/DL    BUN/Creatinine ratio 18 12 - 20      GFR est AA >60 >60 ml/min/1.73m2    GFR est non-AA >60 >60 ml/min/1.73m2    Calcium 6.9 (L) 8.5 - 10.1 MG/DL   MAGNESIUM    Collection Time: 11/12/19  9:03 AM   Result Value Ref Range    Magnesium 2.2 1.6 - 2.4 mg/dL   PHOSPHORUS    Collection Time: 11/12/19  9:03 AM   Result Value Ref Range    Phosphorus 2.6 2.6 - 4.7 MG/DL             Telemetry:normal sinus rhythm    Imaging:  I have personally reviewed the patients radiographs and have reviewed the reports:  CT with LLL basilar airspace dz        Total critical care time exclusive of procedures: 50 minutes  Deandra Dunn MD

## 2019-11-12 NOTE — PROGRESS NOTES
Bedside and Verbal shift change report given to Samantha Recinos RN (oncoming nurse) by Hema Wills RN (offgoing nurse). Report included the following information SBAR, Kardex, ED Summary, Procedure Summary, Intake/Output, MAR, Recent Results, Med Rec Status, Cardiac Rhythm SR and Alarm Parameters . 1500- Received patient resting in bed comfortably, easily arouseable and talking to staff. Awake, alert and oriented, with some forgetfulness and needs some reorientation to situation. Mother at bedside. Respirations even easy unlabored. No difficulty breathing noted at rest, denies cough at this time. Lung sounds clear B/L. On 6L Nasal cannula saturation at 97%, tolerating well. Taking sips of water. Abdomen slightly distended, semi-soft. Denies discomfort at this time. No s/s of distress noted. Utilizing urinal to urinate. Denies difficulty, but will continue to monitor since removal of sharma was recent. No edema noted. Call bell in reach, bed locked in lowest position. 1520- As Per Dr. Margarita Roach- D/C POC Glucose q1, change to q6.  1600- No c/o at this time from patient, tolerating sips well. 1630- Patient short of breath, RT alerted for breathing treatment. 1700- Dr. Margarita Roach made aware of patient's anxiety, was not aware of extent of what happened, talked through with this RN. Dr. Margarita Roach placing order for seroquel. 1815- Blood Glucose 121- No insulin  Coverage needed. Tolerating sips well. Provided with Seroquel PRN to help with anxiety. 9808- Patient currently watching tv in room. 1900-- Bedside and Verbal shift change report given to Keri Hargrove RN (oncoming nurse) by Bell washington RN (offgoing nurse). Report included the following information SBAR, Kardex, ED Summary, Procedure Summary, Intake/Output, MAR, Recent Results, Med Rec Status, Cardiac Rhythm NSR and Alarm Parameters .

## 2019-11-12 NOTE — PROGRESS NOTES
12 - TRANSFER - IN REPORT:    Verbal report received from [facility](name) on Mary Lou Pair  being received from ED(unit) for routine progression of care      Report consisted of patients Situation, Background, Assessment and   Recommendations(SBAR). Information from the following report(s) SBAR, Kardex, ED Summary, MAR, Recent Results and Cardiac Rhythm SR was reviewed with the receiving nurse. Opportunity for questions and clarification was provided. Assessment completed upon patients arrival to unit and care assumed. 9320 - Patient arrived from ED, completely awake on 50mcg of prop, reaching for ETT, coughing and bucking the vent, patient redirected, pupils pinpoint, moving extremities purposefully, restraints applied, lungs coarse, suction performed with brown thin secretions, HR in 80s NSR, bowel sounds active, abdomen distended, semi-soft, OGT in place to continuous suction, sharma catheter in place, tmep 98.4, warming blanket removed. 1000 - Dr. Zari Roque at the bedside, patient placed on SBT, prop decreased, patient is calm and relaxed, will continue to monitor. 1015 - Family at the bedside, and updated with plan of care. 1054 - Patient is drowsy but opening eyes spontaneously, follows commands,  equal bilaterally, move BLE purposefully, VSS and will continue to monitor. Patient requesting albuterol treatment, RT called and notified. 1130 - Per Dr. Zari Roque turn off prop and recheck ABG in 30 minutes. 1200 - Patient is alert and oriented, states he took 2 lines of heroin for the 1st time this AM before admission, he following all commands, restraints removed, denies pain, and does not want information shared with his parents who were asked to leave the room. VSS and will continue to monitor. 1245 - Patient okay to extubated per Dr. Zari Roque, OGT removed, on 6L NC, A&Ox4, following commands, Per Dr. Zari Roque okay for the patient to have sips of water, patient passed bedside swallow.    1400 - Per Dr. Denise baez to remove foely catheter. 1430 - Partida catheter removed and patient has voided 50cc post removal.   1500 - Bedside shift change report given to [address] [place] [address] (oncoming nurse) by Travis Massey (offgoing nurse).  Report included the following information SBAR, Kardex, ED Summary, MAR, Recent Results and Cardiac Rhythm SR.

## 2019-11-12 NOTE — CONSULTS
Surgery Consult    Subjective:      Real Borjas is a 53 y.o. male brought to the ED overnight with AMS. Patient sedated at time of exam and family at bedside were not privy to events leading up to admission, so history obtained from the medical record and ED staff. Patient was found unresponsive at home and she called EMS. He was given Narcan, eventually became combative, and was then given Ketamine. On arrival in ED he was intubated. CTA C/A/P showed left basilar atalectasis/ consolidation, massve gaseous distention of the stomach and some small bowel distention concerning for SBO. OG tube in place. His mother is at the bedside. She reports she saw patient earlier yesterday and he had seemed well. No recent illness or issues with abdominal pain as far as she knows. He has no history of bowel obstruction or abdominal surgery.      Past Medical History:   Diagnosis Date    ADD (attention deficit disorder)     Asthma     Depression      Past Surgical History:   Procedure Laterality Date    HX ORTHOPAEDIC      Elbow    HX TONSILLECTOMY      1985      Family History   Problem Relation Age of Onset    Arthritis-osteo Maternal Grandmother     Heart Disease Maternal Grandmother         CABG/Stents    Cancer Maternal Grandfather         Unknown     Social History     Socioeconomic History    Marital status: SINGLE     Spouse name: Not on file    Number of children: 0    Years of education: 13    Highest education level: Not on file   Occupational History    Occupation: sale     Comment: [address]   Tobacco Use    Smoking status: Former Smoker     Types: Cigarettes     Last attempt to quit: 2013     Years since quittin.2    Smokeless tobacco: Never Used   Substance and Sexual Activity    Alcohol use: No    Drug use: No    Sexual activity: Not Currently     Partners: Female      Current Facility-Administered Medications   Medication Dose Route Frequency    propofol (DIPRIVAN) infusion  0-50 mcg/kg/min IntraVENous TITRATE    midazolam (VERSED) injection 5 mg  5 mg IntraVENous NOW    0.9% sodium chloride infusion  125 mL/hr IntraVENous CONTINUOUS    insulin regular (NOVOLIN R, HUMULIN R) 100 Units in 0.9% sodium chloride 100 mL infusion  0-50 Units/hr IntraVENous TITRATE    insulin lispro (HUMALOG) injection   SubCUTAneous TIDAC    glucose chewable tablet 16 g  4 Tab Oral PRN    glucagon (GLUCAGEN) injection 1 mg  1 mg IntraMUSCular PRN    dextrose 10% infusion 0-250 mL  0-250 mL IntraVENous PRN    cefepime (MAXIPIME) 2 g in 0.9% sodium chloride (MBP/ADV) 100 mL  2 g IntraVENous Q12H    Vancomycin - pharmacy to dose by level   Other Rx Dosing/Monitoring    [START ON 11/13/2019] Vancomycin - Random level 11/13 @ 0800   Other ONCE     Current Outpatient Medications   Medication Sig    escitalopram oxalate (LEXAPRO) 20 mg tablet Take 20 mg by mouth daily.  diphenhydrAMINE (BENADRYL) 25 mg tablet Take 25 mg by mouth every six (6) hours as needed for Sleep (allergies).  albuterol (PROVENTIL HFA, VENTOLIN HFA, PROAIR HFA) 90 mcg/actuation inhaler Take 2 Puffs by inhalation every four (4) hours as needed for Wheezing or Shortness of Breath.  cetirizine (ZYRTEC) 10 mg tablet Take 10 mg by mouth daily as needed.       No Known Allergies    Review of Systems:REVIEW OF SYSTEMS:     [x]     Unable to obtain  ROS due to  [x]    mental status change  [x]    sedated   []    intubated   []    Total of 12 systems reviewed as follows:        Objective:        Patient Vitals for the past 8 hrs:   BP Temp Pulse Resp SpO2 Height Weight   11/12/19 0830 97/69 97.3 °F (36.3 °C) 84 20      11/12/19 0822   85 20 98 %     11/12/19 0645 106/66  86 16 96 %     11/12/19 0630 114/72  85 16 95 %     11/12/19 0615 112/74  86 18 95 %     11/12/19 0600 114/72  86 22 100 %     11/12/19 0554  95.2 °F (35.1 °C)        11/12/19 0545 111/64  79 24 100 %     11/12/19 0543 113/65    100 %   19 0504 122/71  83 20 96 %     19 0500 106/68  88 28 99 %     19 0445 117/74  96 24 94 %     19 0430 (!) 135/120  82 30 94 %     19 0427   60 27 95 %     19 0415 (!) 180/97  69 20 94 %     19 0413 145/81 (!) 94.5 °F (34.7 °C) 72 25 93 % 5' 10\" (1.778 m) 70.3 kg (155 lb)       Temp (24hrs), Av.7 °F (35.4 °C), Min:94.5 °F (34.7 °C), Max:97.3 °F (36.3 °C)      Physical Exam:  General:  Intubated, sedated   Eyes:  Conjunctivae/corneas clear. Nose: Nares normal. Septum midline   Mouth/Throat: OG tube in place   Neck: Symmetrical, trachea midline   Lungs:   Ventilated, mild expiratory wheeze   Heart:  Regular rate and rhythm   Abdomen:   Soft, mild distention, non-tender   Extremities: Extremities normal, atraumatic, no cyanosis or edema. Skin: Skin color, texture, turgor normal. No rashes or lesions   Neuro: Sedated, unresponsive      Labs:   Recent Labs     19   WBC 17.1*   HGB 15.0   HCT 47.8   *     Recent Labs     19      K 3.7      CO2 22   *   BUN 16   CREA 1.64*   CA 8.5   MG 2.8*   ALB 3.9   TBILI 0.4   SGOT 22   ALT 27     No results for input(s): INR, INREXT in the last 72 hours. Assessment and Plan:     Acute respiratory failure  AMS  Polysubstance abuse  Gastric/small bowel distention on CT    Patient admitted with AMS and respiratory failure requiring intubation. CT reviewed and there is massive gaseous distention of the stomach and some mild small bowel dilatation. I suspect imaging findings may be from introduction of air during pre-intubation bagging vs ileus related to his medical problems. Much less likely a true mechanical obstruction. Continue OBT and IVF hydration. No indication for surgical intervention. We will follow.        Signed By: ORI Irvin     2019

## 2019-11-12 NOTE — ED PROVIDER NOTES
HPI       70-year-old male with a history of asthma and depression, ADD, presents to the ED in respiratory distress. EMS states they were called by his girlfriend who is only known him for 5 days and was unable to provide any history. She states she heard him moaning and found him on the floor unresponsive. EMS EMS states they gave him 4 of Narcan with no response. However, by the time I come to the back of the truck he was awake and combative. He was not following any commands. They noted coffee-ground emesis in his mouth. They gave him 200 mg of ketamine to control his combativeness. He was placed on 100% nonrebreather. Accu-Chek was reportedly in the 300s.   Past Medical History:   Diagnosis Date    ADD (attention deficit disorder)     Asthma     Depression        Past Surgical History:   Procedure Laterality Date    HX ORTHOPAEDIC      Elbow    HX TONSILLECTOMY               Family History:   Problem Relation Age of Onset    Arthritis-osteo Maternal Grandmother     Heart Disease Maternal Grandmother         CABG/Stents    Cancer Maternal Grandfather         Unknown       Social History     Socioeconomic History    Marital status: SINGLE     Spouse name: Not on file    Number of children: 0    Years of education: 13    Highest education level: Not on file   Occupational History    Occupation: sale     Comment: [address] [employer] Financial resource strain: Not on file    Food insecurity:     Worry: Not on file     Inability: Not on file   [employer] needs:     Medical: Not on file     Non-medical: Not on file   Tobacco Use    Smoking status: Former Smoker     Types: Cigarettes     Last attempt to quit: 2013     Years since quittin.2    Smokeless tobacco: Never Used   Substance and Sexual Activity    Alcohol use: No    Drug use: No    Sexual activity: Not Currently     Partners: Female   Lifestyle    Physical activity:     Days per week: Not on file Minutes per session: Not on file    Stress: Not on file   Relationships    Social connections:     Talks on phone: Not on file     Gets together: Not on file     Attends Nondenominational service: Not on file     Active member of club or organization: Not on file     Attends meetings of clubs or organizations: Not on file     Relationship status: Not on file    Intimate partner violence:     Fear of current or ex partner: Not on file     Emotionally abused: Not on file     Physically abused: Not on file     Forced sexual activity: Not on file   Other Topics Concern    Not on file   Social History Narrative    Not on file         ALLERGIES: Patient has no known allergies. Review of Systems   Unable to perform ROS: Acuity of condition       Vitals:    11/12/19 0413   BP: 145/81   Pulse: 72   Resp: 25   SpO2: 93%   Weight: 70.3 kg (155 lb)   Height: 5' 10\" (1.778 m)            Physical Exam   Constitutional: He appears well-developed and well-nourished. He appears distressed. HENT:   Head: Normocephalic and atraumatic. Mouth/Throat: No oropharyngeal exudate. Eyes: Pupils are equal, round, and reactive to light. Right eye exhibits no discharge. Left eye exhibits no discharge. No scleral icterus. Neck: Normal range of motion. Neck supple. No JVD present. Cardiovascular: Normal rate, regular rhythm and normal heart sounds. No murmur heard. Pulmonary/Chest: No stridor. He is in respiratory distress. He has wheezes. He has no rales. He exhibits no tenderness. Abdominal: Soft. Bowel sounds are normal. He exhibits distension. He exhibits no mass. There is no tenderness. There is no rebound and no guarding. Musculoskeletal: Normal range of motion. Neurological:   Eyes open, unresponsive   Skin: Skin is warm and dry. Capillary refill takes less than 2 seconds. No rash noted.         MDM  Number of Diagnoses or Management Options  Acute respiratory failure with hypoxia and hypercapnia ([address]):   Complete intestinal obstruction, unspecified cause ([facility] [address]):   Sepsis, due to unspecified organism, unspecified whether acute organ dysfunction present [facility]):   Critical Care  Total time providing critical care: 30-74 minutes  70 minutes       Intubation  Date/Time: 11/12/2019 4:23 AM  Performed by: Wesley Tinoco MD  Authorized by: Wesley Tinoco MD     Consent:     Consent obtained:  Emergent situation  Pre-procedure details:     Patient status:  Unresponsive    Pretreatment meds: patient recieved Ketamine by EMS. Paralytics:  Succinylcholine  Procedure details:     Preoxygenation:  Nonrebreather mask    CPR in progress: no      Intubation method:  Oral    Oral intubation technique:  Video-assisted    Laryngoscope blade: Mac 3    Tube size (mm):  7.5    Tube type:  Cuffed    Number of attempts:  1    Cricoid pressure: no      Tube visualized through cords: yes    Placement assessment:     ETT to teeth:  24    Tube secured with:  ETT bradley    Breath sounds:  Equal    Placement verification: chest rise    Post-procedure details:     Patient tolerance of procedure: Tolerated well, no immediate complications        Respiratory therapy changed the vent settings to increased rate of 26 due to high CO2. I've asked them to turn the rate back down to 14. Repeat gas in 30 minutes. 17K WBC with elevated lactate. Fluids, triple antibiotics. Received nebs and steroids. Sedated on vent. CT scan shows bowel obstruction. Will admit to medicine/ICU given respiratory status and consult surgery. Kaia Tovar Serve for Admission  6:03 AM    ED Room Number: ER01/01  Patient Name and age:  Mary Machado 53 y.o.  male  Working Diagnosis:   1. Acute respiratory failure with hypoxia and hypercapnia (HCC)    2. Complete intestinal obstruction, unspecified cause ([facility] [address]    3.  Sepsis, due to unspecified organism, unspecified whether acute organ dysfunction present [facility])      Readmission: no  Isolation Requirements: no  Recommended Level of Care:  ICU  Code Status:  Full Code  Department:[facility] ED - [phone]  Other:  53year old male with asthma, presented in respiratory distress. Unable to get any history. Intubated on arrival.  WBC17, lactate 5. Received nebs/steroids for asthma. Empiric vanc/levaquin/cefepime. CT abdomen/pelvis shows SBO. I have consult to surgery placed. CO2 was 99 on arrival.     Spoke with Dr. Magi Grijalva, general surgery, he will see patient. ED EKG interpretation:  Rhythm: normal sinus rhythm; and regular . Rate (approx.): 78; Axis: normal; P wave: normal; QRS interval: normal ; ST/T wave: non-specific changes; This EKG was interpreted by Alec Ozuna MD,ED Provider.

## 2019-11-12 NOTE — PROGRESS NOTES
[facility]: MED RECONCILIATION    Comments/Recommendations:   Patient is intubated currently and interview was completed with patient's mother. She does not live with him but was able to provide some medication history as noted below. Compliance with current medications is unknown. Medications added:     Diphenhydramine OTC (per mother she saw a bottle of diphenhydramine this morning at patient's home)    Medications removed:    Methylphenidate - Per mother he no longer takes this. There was a prescription filled on 10/22/19 for Adderall 20 mg PO BID and may take an additional 1/2 tablet (10 mg) daily if needed, however patient's mother thinks that he only takes the escitalopram.     Medications adjusted:    Escitalopram 20 mg PO daily (instead of 5 mg)    Information obtained from: patient's mother, Rx query    Significant PMH/Disease States:   Past Medical History:   Diagnosis Date    ADD (attention deficit disorder)     Asthma     Depression      Chief Complaint for this Admission:   Chief Complaint   Patient presents with    Unresponsive     Allergies: Patient has no known allergies. Prior to Admission Medications:     Prior to Admission Medications   Prescriptions Last Dose Informant Patient Reported? Taking? albuterol (PROVENTIL HFA, VENTOLIN HFA, PROAIR HFA) 90 mcg/actuation inhaler  Parent No Yes   Sig: Take 2 Puffs by inhalation every four (4) hours as needed for Wheezing or Shortness of Breath. cetirizine (ZYRTEC) 10 mg tablet  Parent Yes Yes   Sig: Take 10 mg by mouth daily as needed. diphenhydrAMINE (BENADRYL) 25 mg tablet  Parent Yes Yes   Sig: Take 25 mg by mouth every six (6) hours as needed for Sleep (allergies). escitalopram oxalate (LEXAPRO) 20 mg tablet unknown Parent Yes Yes   Sig: Take 20 mg by mouth daily. Facility-Administered Medications: None     Thank you for the consult,  Kashif MACKEY, [facility]

## 2019-11-12 NOTE — PROGRESS NOTES
[facility] Dosing Services: Antimicrobial Stewardship Progress Note    Consult for antibiotic dosing of vancomycin by Dr. Parisa Otero  Pharmacist reviewed antibiotic appropriateness for 53year old , male  for indication of bacteremia/intra-abdominal  Day of Therapy 1    Plan:  Vancomycin therapy:  LD: 1250mg x1  MD: 1000mg beth 8 hrs     Dose calculated to approximate a therapeutic trough of 15-20 mcg/mL. Last trough level / Plan for level:   Empirically changed to 1000mg every 8 hrs given significant improvement renal function over a few hours  Trough ordered for 0630 on 11/13    Pharmacy to follow daily and will make changes to dose and/or frequency based on clinical status. Date Dose & Interval Measured (mcg/mL) Extrapolated (mcg/mL)   ? ? ? ?   ? ? ? ?   ? ? ? ? Other Antimicrobial  (not dosed by pharmacist)   Cefepime 2gm IV every 8 hrs   Cultures     11/12: Blood- pending  11/112: MRSA- pending   Serum Creatinine     Lab Results   Component Value Date/Time    Creatinine 0.90 11/12/2019 09:03 AM    Creatinine (POC) 1.4 (H) 11/12/2019 04:14 AM         Creatinine Clearance Estimated Creatinine Clearance: 102 mL/min (based on SCr of 0.9 mg/dL). Procalcitonin    Lab Results   Component Value Date/Time    Procalcitonin <0.1 11/12/2019 04:19 AM          Temp   98.3 °F (36.8 °C)    WBC   Lab Results   Component Value Date/Time    WBC 17.1 (H) 11/12/2019 04:19 AM         H/H   Lab Results   Component Value Date/Time    HGB 15.0 11/12/2019 04:19 AM        Platelets Lab Results   Component Value Date/Time    PLATELET 172 (H) 61/88/4542 04:19 AM              Pharmacist: Hermann Lang [place]

## 2019-11-12 NOTE — ED TRIAGE NOTES
Triage: Patient presents from home via EMS, GF x 5 days called EMS. When EMS arrived he was in the floor moaning, GF had placed frozen fries on his groin, denies Hx of drug use. Reports that he has been using his albuterol inhaler a lot tonight. EMS administered 0.4 Narcan was more responsive. Once in the squad he was yelling, screaming, fighting, 200 MG of Ketamine given, glucose was 345. Patient arrives on a NR, unresponsive. MD at bedside for intubation.

## 2019-11-12 NOTE — H&P
History & Physical      Date of admission: 11/12/2019    Patient name: Juan Rosenbaum  MRN: [id number]  YOB: 1975  Age: 53 y.o. Primary care provider:  Anabelle Solis MD     Source of Information: patient, ED and electronic medical records                              Chief complaint:  Patient unable to provide    History of present illness  Juan Rosenbaum is a 53 y.o. male with past medical history of ADD, Asthma, depression presented to the ED via EMS with reported AMS (altered mental status). Patient is already ETT intubated and was placed on mechanical ventilator support on arrival to the ED this morning. As such, history was obtained per my review of ED and electronic medical records. Later some additional history was obtained from patient's next of kin- his mother. EMS was reportedly called by patient's girlfriend as she heard patient moaning. Patient reportedly was found on the floor unresponsive. Patient was noted to have coffee ground emesis in his mouth. Patient was reportedly given Ketamine per EMS. He was initially on a 100% non-re breather and then was endotracheally intubated on admission to the ED. Patient is now seen for admission for further evaluation and treatments. Per discussion with patient's mother, she saw the patient yesterday at which time he had no complaints. Past Medical History:   Diagnosis Date    ADD (attention deficit disorder)     Asthma     Depression       Past Surgical History:   Procedure Laterality Date    HX ORTHOPAEDIC      Elbow    HX TONSILLECTOMY      1985     Prior to Admission medications    Medication Sig Start Date End Date Taking? Authorizing Provider   albuterol (PROVENTIL HFA, VENTOLIN HFA, PROAIR HFA) 90 mcg/actuation inhaler Take 2 Puffs by inhalation every four (4) hours as needed for Wheezing or Shortness of Breath.  5/12/15   Welford Day, DO MULTIVITAMINS WITH FLUORIDE (MULTI-VITAMIN PO) Take  by mouth. Provider, Historical   cetirizine (ZYRTEC) 10 mg tablet Take  by mouth daily. Provider, Historical   ALBUTEROL SULFATE PO Take  by mouth. Provider, Historical   escitalopram oxalate (LEXAPRO) 5 mg tablet Take 1 tablet by mouth daily. 9/3/14   Krystina Gonzales MD   methylphenidate (RITALIN LA) 10 mg LA capsule Take 10 mg by mouth every morning. 9/3/14   Krystina Gonzales MD     ALLERGIES:  No Known Allergies      Social history  Patient resides  x  Independently                Ambulates  x  Independently                 Alcohol history   x  occasional           Smoking history          x  Current smoker       Illegal drugs:    Family History   Problem Relation Age of Onset    Arthritis-osteo Maternal Grandmother     Heart Disease Maternal Grandmother         CABG/Stents    Cancer Maternal Grandfather         Unknown      FAMILY HISTORY:  Unknown regarding heart disease    Review of systems  Unable to obtain history from patient who is sedated on ventilator     Physical Examination   Visit Vitals  /66   Pulse 86   Temp 95.2 °F (35.1 °C)   Resp 16   Ht 5' 10\" (1.778 m)   Wt 70.3 kg (155 lb)   SpO2 96%   BMI 22.24 kg/m²          O2 Device: Ventilator     General:  Patient currently on mechanical ventilator support; ill appearing   Head:  Normocephalic, without obvious abnormality, atraumatic   Eyes:  Conjunctivae/corneas clear. Pupils 2 mm reactive bilateral   E/N/M/T: Nares normal. Septum midline.  No nasal drainage or sinus tenderness  Tongue midline/ non-edematous  ETT and orogastric tubes in place   Neck: Normal appearance and movements, symmetrical, trachea midline  No palpable adenopathy  No thyroid enlargement, tenderness or nodules  No carotid bruit   No JVD  Trachea midline   Lungs:   Symmetrical chest expansion and respiratory effort  Clear to auscultation bilaterally   Chest wall:  No tenderness or deformity Heart:  Regular rate and rhythm   Normal S1 and S2; no murmur, click, rub or gallop   Abdomen:   Soft, no tenderness  No rebound, guarding, or rigidity  Non-distended   Bowel sounds hypoactive  No masses or hepatosplenomegaly  No hernias present   Back: No costovertebral angle tenderness  No step-off deformity   Extremities: Extremities normal, atraumatic  No cyanosis or edema     Vascular/  Pulses: 2+ radial/ DP bilateral pulses   Integument/  Skin: No rashes or ulcers  Warm and dry   Musculo-      skeletal: Gait not tested  No calf tenderness   Neuro: GCS 3T. Psych: Unresponsive/ chemically sedated     Geniturinary: Partida catheter in place     I reviewed the following data:      24 Hour Results:  Recent Results (from the past 24 hour(s))   CBC WITH AUTOMATED DIFF    Collection Time: 11/12/19  4:19 AM   Result Value Ref Range    WBC 17.1 (H) 4.1 - 11.1 K/uL    RBC 4.80 4. 10 - 5.70 M/uL    HGB 15.0 12.1 - 17.0 g/dL    HCT 47.8 36.6 - 50.3 %    MCV 99.6 (H) 80.0 - 99.0 FL    MCH 31.3 26.0 - 34.0 PG    MCHC 31.4 30.0 - 36.5 g/dL    RDW 13.2 11.5 - 14.5 %    PLATELET 614 (H) 086 - 400 K/uL    MPV 9.8 8.9 - 12.9 FL    NRBC 0.0 0  WBC    ABSOLUTE NRBC 0.00 0.00 - 0.01 K/uL    NEUTROPHILS 55 32 - 75 %    LYMPHOCYTES 35 12 - 49 %    MONOCYTES 7 5 - 13 %    EOSINOPHILS 1 0 - 7 %    BASOPHILS 1 0 - 1 %    IMMATURE GRANULOCYTES 1 (H) 0.0 - 0.5 %    ABS. NEUTROPHILS 9.5 (H) 1.8 - 8.0 K/UL    ABS. LYMPHOCYTES 6.0 (H) 0.8 - 3.5 K/UL    ABS. MONOCYTES 1.2 (H) 0.0 - 1.0 K/UL    ABS. EOSINOPHILS 0.2 0.0 - 0.4 K/UL    ABS. BASOPHILS 0.1 0.0 - 0.1 K/UL    ABS. IMM.  GRANS. 0.1 (H) 0.00 - 0.04 K/UL    DF AUTOMATED     METABOLIC PANEL, COMPREHENSIVE    Collection Time: 11/12/19  4:19 AM   Result Value Ref Range    Sodium 136 136 - 145 mmol/L    Potassium 3.7 3.5 - 5.1 mmol/L    Chloride 102 97 - 108 mmol/L    CO2 22 21 - 32 mmol/L    Anion gap 12 5 - 15 mmol/L    Glucose 476 (H) 65 - 100 mg/dL    BUN 16 6 - 20 MG/DL Creatinine 1.64 (H) 0.70 - 1.30 MG/DL    BUN/Creatinine ratio 10 (L) 12 - 20      GFR est AA 55 (L) >60 ml/min/1.73m2    GFR est non-AA 45 (L) >60 ml/min/1.73m2    Calcium 8.5 8.5 - 10.1 MG/DL    Bilirubin, total 0.4 0.2 - 1.0 MG/DL    ALT (SGPT) 27 12 - 78 U/L    AST (SGOT) 22 15 - 37 U/L    Alk. phosphatase 78 45 - 117 U/L    Protein, total 7.6 6.4 - 8.2 g/dL    Albumin 3.9 3.5 - 5.0 g/dL    Globulin 3.7 2.0 - 4.0 g/dL    A-G Ratio 1.1 1.1 - 2.2     TROPONIN I    Collection Time: 11/12/19  4:19 AM   Result Value Ref Range    Troponin-I, Qt. <0.05 <0.05 ng/mL   D DIMER    Collection Time: 11/12/19  4:19 AM   Result Value Ref Range    D-dimer 0.33 0.00 - 0.65 mg/L FEU   SAMPLES BEING HELD    Collection Time: 11/12/19  4:19 AM   Result Value Ref Range    SAMPLES BEING HELD 1RED,1SST     COMMENT        Add-on orders for these samples will be processed based on acceptable specimen integrity and analyte stability, which may vary by analyte.    ETHYL ALCOHOL    Collection Time: 11/12/19  4:19 AM   Result Value Ref Range    ALCOHOL(ETHYL),SERUM <10 <10 MG/DL   MAGNESIUM    Collection Time: 11/12/19  4:19 AM   Result Value Ref Range    Magnesium 2.8 (H) 1.6 - 2.4 mg/dL   PROCALCITONIN    Collection Time: 11/12/19  4:19 AM   Result Value Ref Range    Procalcitonin <0.1 ng/mL   DRUG SCREEN, URINE    Collection Time: 11/12/19  4:24 AM   Result Value Ref Range    AMPHETAMINES POSITIVE (A) NEG      BARBITURATES NEGATIVE  NEG      BENZODIAZEPINES NEGATIVE  NEG      COCAINE POSITIVE (A) NEG      METHADONE NEGATIVE  NEG      OPIATES NEGATIVE  NEG      PCP(PHENCYCLIDINE) NEGATIVE  NEG      THC (TH-CANNABINOL) POSITIVE (A) NEG      Drug screen comment (NOTE)    POC LACTIC ACID    Collection Time: 11/12/19  4:25 AM   Result Value Ref Range    Lactic Acid (POC) 5.24 (HH) 0.40 - 2.00 mmol/L   EKG, 12 LEAD, INITIAL    Collection Time: 11/12/19  4:27 AM   Result Value Ref Range    Ventricular Rate 78 BPM    Atrial Rate 78 BPM    P-R Interval 146 ms    QRS Duration 94 ms    Q-T Interval 374 ms    QTC Calculation (Bezet) 426 ms    Calculated P Axis 84 degrees    Calculated R Axis 41 degrees    Calculated T Axis 39 degrees    Diagnosis       Normal sinus rhythm  Normal ECG  No previous ECGs available     BLOOD GAS, ARTERIAL    Collection Time: 11/12/19  4:31 AM   Result Value Ref Range    pH 6.93 (LL) 7.35 - 7.45      PCO2 99 (H) 35.0 - 45.0 mmHg    PO2 82 80 - 100 mmHg    O2 SAT 86 (L) 92 - 97 %    BICARBONATE 20 (L) 22 - 26 mmol/L    BASE DEFICIT 14.3 mmol/L    O2 METHOD VENTILATOR      FIO2 100 %    MODE Pressure Release Ventilation      Tidal volume 450      SET RATE 16      EPAP/CPAP/PEEP 8.0      Sample source ARTERIAL      SITE RIGHT RADIAL      STEPHANIA'S TEST N/A      Critical value read back SUZANNE ROGERS MD    URINALYSIS W/MICROSCOPIC    Collection Time: 11/12/19  4:38 AM   Result Value Ref Range    Color YELLOW/STRAW      Appearance CLOUDY (A) CLEAR      Specific gravity 1.024 1.003 - 1.030      pH (UA) 5.5 5.0 - 8.0      Protein 100 (A) NEG mg/dL    Glucose >1,000 (A) NEG mg/dL    Ketone NEGATIVE  NEG mg/dL    Bilirubin NEGATIVE  NEG      Blood MODERATE (A) NEG      Urobilinogen 0.2 0.2 - 1.0 EU/dL    Nitrites NEGATIVE  NEG      Leukocyte Esterase NEGATIVE  NEG      WBC 10-20 0 - 4 /hpf    RBC 0-5 0 - 5 /hpf    Epithelial cells FEW FEW /lpf    Bacteria NEGATIVE  NEG /hpf    Amorphous Crystals FEW (A) NEG      Hyaline cast 10-20 0 - 5 /lpf    Spermatozoa PRESENT     URINE CULTURE HOLD SAMPLE    Collection Time: 11/12/19  4:38 AM   Result Value Ref Range    Urine culture hold        URINE ON HOLD IN MICROBIOLOGY DEPT FOR 3 DAYS. IF UNPRESERVED URINE IS SUBMITTED, IT CANNOT BE USED FOR ADDITIONAL TESTING AFTER 24 HRS, RECOLLECTION WILL BE REQUIRED.    BLOOD GAS, ARTERIAL    Collection Time: 11/12/19  4:55 AM   Result Value Ref Range    pH 7.04 (LL) 7.35 - 7.45      PCO2 82 (H) 35.0 - 45.0 mmHg    PO2 90 80 - 100 mmHg    O2 SAT 92 92 - 97 % BICARBONATE 22 22 - 26 mmol/L    BASE DEFICIT 10.9 mmol/L    O2 METHOD VENTILATOR      FIO2 100 %    MODE Pressure Release Ventilation      Tidal volume 450      SET RATE 26      EPAP/CPAP/PEEP 8.0      Sample source ARTERIAL      SITE RIGHT BRACHIAL      STEPHANIA'S TEST N/A      Critical value read back SUZANNE ROGERS MD    POC LACTIC ACID    Collection Time: 11/12/19  6:42 AM   Result Value Ref Range    Lactic Acid (POC) 1.99 0.40 - 2.00 mmol/L     Recent Labs     11/12/19  0419   WBC 17.1*   HGB 15.0   HCT 47.8   *     Recent Labs     11/12/19  0419      K 3.7      CO2 22   *   BUN 16   CREA 1.64*   CA 8.5   MG 2.8*   ALB 3.9   TBILI 0.4   SGOT 22   ALT 27       Imaging  CT HEAD WO CONT  Clinical history: Altered mental status  INDICATION: AMS     COMPARISON: None.     CONTRAST: None.     TECHNIQUE: Unenhanced CT of the head was performed using 5 mm images. Brain and  bone windows were generated. CT dose reduction was achieved through use of a  standardized protocol tailored for this examination and automatic exposure  control for dose modulation.       FINDINGS:     Limited examination due to scatter artifact. The ventricles and sulci are normal in size, shape and configuration and  midline. There is no significant white matter disease. There is no intracranial  hemorrhage, extra-axial collection, mass, mass effect or midline shift. The  basilar cisterns are open. No acute infarct is identified. The bone windows  demonstrate no abnormalities. Ethmoid sphenoid and maxillary sinus disease. Mary Ellen Warren IMPRESSION: No acute intracranial process    CT CHEST ABD PELV W CONT  Clinical history: Vomiting, coffee-ground emesis  INDICATION: distended abdomen, vomiting coffee ground     COMPARISON: None     CONTRAST: 100 mL of Isovue-370.     TECHNIQUE:   Following the uneventful intravenous administration of contrast, thin axial  images were obtained through the chest, abdomen and pelvis.  Coronal and sagittal  reconstructions were generated. Oral contrast was not administered. CT dose  reduction was achieved through use of a standardized protocol tailored for this  examination and automatic exposure control for dose modulation.     FINDINGS:      THYROID: No nodule. MEDIASTINUM: No mass or lymphadenopathy. NEIL: No mass or lymphadenopathy. THORACIC AORTA: No dissection or aneurysm. MAIN PULMONARY ARTERY: Normal in caliber. TRACHEA/BRONCHI: ET tube in place. ESOPHAGUS: NG tube tip in the gastric body. HEART: Normal in size. PLEURA: Left basilar atelectasis/consolidation. LUNGS: ET tube in place. Small nodule in the right lung measures 5 mm in size. LIVER: No mass or biliary dilatation. GALLBLADDER: Unremarkable. SPLEEN: No mass. PANCREAS: No mass or ductal dilatation. ADRENALS: Unremarkable. KIDNEYS: No mass, calculus, or hydronephrosis. STOMACH: There is gastric distention. SMALL BOWEL: There is extensive small bowel distention as well. Transition in  the pelvis. COLON: Fecal stasis. APPENDIX: Unremarkable. PERITONEUM: No ascites or pneumoperitoneum. RETROPERITONEUM: No lymphadenopathy or aortic aneurysm. REPRODUCTIVE ORGANS:  URINARY BLADDER: Partida catheter in the bladder. BONES: No destructive bone lesion. ADDITIONAL COMMENTS: N/A     IMPRESSION:  Imaging findings are consistent with small bowel obstruction. Gastric and small bowel distention with transition point in the pelvis.     Left basilar atelectasis/consolidation.     ET tube and NG tube in place. Assessment and Plan     1. Sepsis       - admit to ICU; condition critical       - on Cefepime and continue Vancomycin       - check blood cultures and adjust antibiotics accordingly    2. AMS       -  Continue neuro checks; currently sedated on Propofol on vent    3. Small bowel obstruction       -  Orogastric tube to wall suction       - NPO/ IVFs       - consult general surgery    4.   Acute respiratory failure with acute uncompensated primary respiratory/ metabolic acidosis       -  Continue ventilator management; consult intensivist/ pulmonologist this a.m.       - continuous pulse oximetry    5. Severe metabolic acidosis       - plan as above; ordered IV fluid bolus and maintenance IV infusion    6. Leukocytosis       -  Repeat CBC    7. Hypothermia       - jaya hugger for external warming and temperature monitoring    8. New onset type 2 diabetes mellitus uncontrolled - with nonketotic hyperosmolar diabetic syndrome       -  Order insulin IV infusion with hourly glucose checks per protocol    9. Lactic acidosis       - continue IV fluid resuscitation    10. Cocaine / polysubstance abuse         - would encourage drug cessation    11.   Asthma exacerbation        -  Solumedrol IV and Duoneb treatments    VTE prophylaxis      - SCDs to BLEs       Signed by: Sherry Melendez MD    November 12, 2019 at 6:57 AM

## 2019-11-12 NOTE — PROGRESS NOTES
[facility] visit. Mr. Tess Rivera was asleep and no family with him at this time. Unable to complete a spiritual assessment. He is Anabaptist. Prayer offered.     AP Nix, RN, ACSW, LCSW   Page:  [phone]

## 2019-11-12 NOTE — PROGRESS NOTES
11/12/2019  3:34 PM  Pt discussed in IDR rounds, is continuing to require medical management for AMS, Sepsis, pt had positive UDS. Pt now extubated on 6L O2. SCOTT Plan:  1. D/C when stable to home with family assistance  2. SA treatment program Inpatient vs.Outpatient   3. PCP follow up  4. Psych follow up  5. ACP planning   6.  Family to transport at d/c  CM to follow and assist.  Lynette Ibrahim

## 2019-11-12 NOTE — PROGRESS NOTES
[facility] Dosing Services: 11/12/19    The following medication: Cefepime was automatically dose-adjusted per [facility] P&T Committee Protocol, with respect to renal function. Consult provided for this   53 y.o. , male , for the indication of intra-abdominal.    Dosage changed to:  Cefepime 2gm IV every 8 hrs    Pt Weight:   Wt Readings from Last 1 Encounters:   11/12/19 70.3 kg (155 lb)         Previous Regimen Cefepime 2gm IV every 12 hrs   Serum Creatinine Lab Results   Component Value Date/Time    Creatinine 0.90 11/12/2019 09:03 AM         Creatinine Clearance Estimated Creatinine Clearance: 102 mL/min (based on SCr of 0.9 mg/dL). BUN Lab Results   Component Value Date/Time    BUN 16 11/12/2019 09:03 AM             Additional notes:      Pharmacy to continue to monitor patient daily. Will make dosage adjustments based upon changing renal function. Signed Peri LUNA  [address] [place] [address] information:  [phone]

## 2019-11-12 NOTE — ED NOTES
TRANSFER - OUT REPORT:    Verbal report given to Joseph Mullen (name) on Raheem Zelaya  being transferred to ICU (unit) for routine progression of care       Report consisted of patients Situation, Background, Assessment and   Recommendations(SBAR). Information from the following report(s) SBAR, ED Summary, [facility] and Recent Results was reviewed with the receiving nurse. Lines:   Peripheral IV 11/12/19 Right Forearm (Active)   Site Assessment Clean, dry, & intact 11/12/2019  4:10 AM   Phlebitis Assessment 0 11/12/2019  4:10 AM   Infiltration Assessment 0 11/12/2019  4:10 AM   Dressing Status Clean, dry, & intact 11/12/2019  4:10 AM   Dressing Type Transparent 11/12/2019  4:10 AM   Hub Color/Line Status Green 11/12/2019  4:10 AM       Peripheral IV 11/12/19 Left Antecubital (Active)   Site Assessment Clean, dry, & intact 11/12/2019  4:10 AM   Phlebitis Assessment 0 11/12/2019  4:10 AM   Infiltration Assessment 0 11/12/2019  4:10 AM   Dressing Status Clean, dry, & intact 11/12/2019  4:10 AM   Dressing Type Transparent 11/12/2019  4:10 AM   Hub Color/Line Status Green 11/12/2019  4:10 AM        Opportunity for questions and clarification was provided.       Patient transported with:   Monitor  Registered Nurse   ventilator

## 2019-11-12 NOTE — PROGRESS NOTES
Pharmacy changed Vancomycin to 1250 mg IV now, per protocol, for CHERYL. Will follow for further orders. Per protocol.

## 2019-11-12 NOTE — PROGRESS NOTES
11/12/2019  8:52 AM  Case management note    Reason for Admission:   sepsi  Patient had talked to mom earlier in day, found down at home. Positive drug screen. Patient is independent and works  [employer]                   RRAT Score:        5             Plan for utilizing home health: To be determined by PT/ OT during hospitalization                    Current Advanced Directive/Advance Care Plan: does not have                         Transition of Care Plan:                      1. Home with family assistance  2. HH vs in patient treatment  3. PCP follow up  4. Psych follow up  5. AD planning   6.  CM to follow until discharge    Care Management Interventions  PCP Verified by CM: Yes(dr. Sharion Mortimer no nn)  Mode of Transport at Discharge: Self  Transition of Care Consult (CM Consult): Discharge Planning  Current Support Network: [facility] Follow Up Transport: Family  Plan discussed with Pt/Family/Caregiver: Yes  Discharge Location  Discharge Placement: Home with family assistance  Jackelyn Gonzalez

## 2019-11-13 ENCOUNTER — APPOINTMENT (OUTPATIENT)
Dept: GENERAL RADIOLOGY | Age: 44
DRG: 871 | End: 2019-11-13
Attending: INTERNAL MEDICINE
Payer: COMMERCIAL

## 2019-11-13 LAB
ALBUMIN SERPL-MCNC: 2.6 G/DL (ref 3.5–5)
ALBUMIN/GLOB SERPL: 1 {RATIO} (ref 1.1–2.2)
ALP SERPL-CCNC: 40 U/L (ref 45–117)
ALT SERPL-CCNC: 19 U/L (ref 12–78)
ANION GAP SERPL CALC-SCNC: 7 MMOL/L (ref 5–15)
ARTERIAL PATENCY WRIST A: YES
AST SERPL-CCNC: 20 U/L (ref 15–37)
BACTERIA SPEC CULT: NORMAL
BACTERIA SPEC CULT: NORMAL
BASE DEFICIT BLDA-SCNC: 1.6 MMOL/L
BASOPHILS # BLD: 0 K/UL (ref 0–0.1)
BASOPHILS NFR BLD: 0 % (ref 0–1)
BDY SITE: ABNORMAL
BILIRUB SERPL-MCNC: 0.5 MG/DL (ref 0.2–1)
BUN SERPL-MCNC: 9 MG/DL (ref 6–20)
BUN/CREAT SERPL: 9 (ref 12–20)
CALCIUM SERPL-MCNC: 7.4 MG/DL (ref 8.5–10.1)
CHLORIDE SERPL-SCNC: 112 MMOL/L (ref 97–108)
CO2 SERPL-SCNC: 23 MMOL/L (ref 21–32)
CREAT SERPL-MCNC: 0.98 MG/DL (ref 0.7–1.3)
DATE LAST DOSE: ABNORMAL
DIFFERENTIAL METHOD BLD: ABNORMAL
EOSINOPHIL # BLD: 0 K/UL (ref 0–0.4)
EOSINOPHIL NFR BLD: 0 % (ref 0–7)
ERYTHROCYTE [DISTWIDTH] IN BLOOD BY AUTOMATED COUNT: 14 % (ref 11.5–14.5)
GAS FLOW.O2 O2 DELIVERY SYS: 4 L/MIN
GLOBULIN SER CALC-MCNC: 2.7 G/DL (ref 2–4)
GLUCOSE BLD STRIP.AUTO-MCNC: 113 MG/DL (ref 65–100)
GLUCOSE BLD STRIP.AUTO-MCNC: 84 MG/DL (ref 65–100)
GLUCOSE SERPL-MCNC: 92 MG/DL (ref 65–100)
HCO3 BLDA-SCNC: 21 MMOL/L (ref 22–26)
HCT VFR BLD AUTO: 33.4 % (ref 36.6–50.3)
HGB BLD-MCNC: 11.2 G/DL (ref 12.1–17)
IMM GRANULOCYTES # BLD AUTO: 0.1 K/UL (ref 0–0.04)
IMM GRANULOCYTES NFR BLD AUTO: 1 % (ref 0–0.5)
LYMPHOCYTES # BLD: 2 K/UL (ref 0.8–3.5)
LYMPHOCYTES NFR BLD: 16 % (ref 12–49)
MAGNESIUM SERPL-MCNC: 2 MG/DL (ref 1.6–2.4)
MCH RBC QN AUTO: 32.1 PG (ref 26–34)
MCHC RBC AUTO-ENTMCNC: 33.5 G/DL (ref 30–36.5)
MCV RBC AUTO: 95.7 FL (ref 80–99)
MONOCYTES # BLD: 0.8 K/UL (ref 0–1)
MONOCYTES NFR BLD: 6 % (ref 5–13)
NEUTS SEG # BLD: 9.5 K/UL (ref 1.8–8)
NEUTS SEG NFR BLD: 77 % (ref 32–75)
NRBC # BLD: 0 K/UL (ref 0–0.01)
NRBC BLD-RTO: 0 PER 100 WBC
PCO2 BLDA: 31 MMHG (ref 35–45)
PH BLDA: 7.45 [PH] (ref 7.35–7.45)
PHOSPHATE SERPL-MCNC: 1.8 MG/DL (ref 2.6–4.7)
PLATELET # BLD AUTO: 198 K/UL (ref 150–400)
PMV BLD AUTO: 9.6 FL (ref 8.9–12.9)
PO2 BLDA: 49 MMHG (ref 80–100)
POTASSIUM SERPL-SCNC: 3.6 MMOL/L (ref 3.5–5.1)
PROT SERPL-MCNC: 5.3 G/DL (ref 6.4–8.2)
RBC # BLD AUTO: 3.49 M/UL (ref 4.1–5.7)
REPORTED DOSE,DOSE: ABNORMAL UNITS
REPORTED DOSE/TIME,TMG: ABNORMAL
SAO2 % BLD: 87 % (ref 92–97)
SAO2% DEVICE SAO2% SENSOR NAME: ABNORMAL
SERVICE CMNT-IMP: ABNORMAL
SERVICE CMNT-IMP: ABNORMAL
SERVICE CMNT-IMP: NORMAL
SERVICE CMNT-IMP: NORMAL
SODIUM SERPL-SCNC: 142 MMOL/L (ref 136–145)
SPECIMEN SITE: ABNORMAL
TSH SERPL DL<=0.05 MIU/L-ACNC: 0.32 UIU/ML (ref 0.36–3.74)
VANCOMYCIN TROUGH SERPL-MCNC: 13.6 UG/ML (ref 5–10)
WBC # BLD AUTO: 12.3 K/UL (ref 4.1–11.1)

## 2019-11-13 PROCEDURE — 74011250636 HC RX REV CODE- 250/636: Performed by: INTERNAL MEDICINE

## 2019-11-13 PROCEDURE — 74011000250 HC RX REV CODE- 250: Performed by: INTERNAL MEDICINE

## 2019-11-13 PROCEDURE — 83735 ASSAY OF MAGNESIUM: CPT

## 2019-11-13 PROCEDURE — 65270000029 HC RM PRIVATE

## 2019-11-13 PROCEDURE — 36600 WITHDRAWAL OF ARTERIAL BLOOD: CPT

## 2019-11-13 PROCEDURE — 80202 ASSAY OF VANCOMYCIN: CPT

## 2019-11-13 PROCEDURE — 71045 X-RAY EXAM CHEST 1 VIEW: CPT

## 2019-11-13 PROCEDURE — 82962 GLUCOSE BLOOD TEST: CPT

## 2019-11-13 PROCEDURE — 94762 N-INVAS EAR/PLS OXIMTRY CONT: CPT

## 2019-11-13 PROCEDURE — 36415 COLL VENOUS BLD VENIPUNCTURE: CPT

## 2019-11-13 PROCEDURE — 74011636637 HC RX REV CODE- 636/637: Performed by: INTERNAL MEDICINE

## 2019-11-13 PROCEDURE — 74011250636 HC RX REV CODE- 250/636: Performed by: FAMILY MEDICINE

## 2019-11-13 PROCEDURE — 74011000258 HC RX REV CODE- 258: Performed by: INTERNAL MEDICINE

## 2019-11-13 PROCEDURE — 74011250637 HC RX REV CODE- 250/637: Performed by: INTERNAL MEDICINE

## 2019-11-13 PROCEDURE — 84443 ASSAY THYROID STIM HORMONE: CPT

## 2019-11-13 PROCEDURE — 94640 AIRWAY INHALATION TREATMENT: CPT

## 2019-11-13 PROCEDURE — 77030027138 HC INCENT SPIROMETER -A

## 2019-11-13 PROCEDURE — 84100 ASSAY OF PHOSPHORUS: CPT

## 2019-11-13 PROCEDURE — 74011000258 HC RX REV CODE- 258: Performed by: FAMILY MEDICINE

## 2019-11-13 PROCEDURE — 85025 COMPLETE CBC W/AUTO DIFF WBC: CPT

## 2019-11-13 PROCEDURE — 82803 BLOOD GASES ANY COMBINATION: CPT

## 2019-11-13 PROCEDURE — 80053 COMPREHEN METABOLIC PANEL: CPT

## 2019-11-13 RX ORDER — ESCITALOPRAM OXALATE 10 MG/1
20 TABLET ORAL DAILY
Status: DISCONTINUED | OUTPATIENT
Start: 2019-11-13 | End: 2019-11-16 | Stop reason: HOSPADM

## 2019-11-13 RX ORDER — LORATADINE 10 MG/1
10 TABLET ORAL DAILY
Status: DISCONTINUED | OUTPATIENT
Start: 2019-11-14 | End: 2019-11-16 | Stop reason: HOSPADM

## 2019-11-13 RX ORDER — PREDNISONE 20 MG/1
40 TABLET ORAL
Status: DISCONTINUED | OUTPATIENT
Start: 2019-11-13 | End: 2019-11-16 | Stop reason: HOSPADM

## 2019-11-13 RX ORDER — SODIUM CHLORIDE 450 MG/100ML
50 INJECTION, SOLUTION INTRAVENOUS CONTINUOUS
Status: DISCONTINUED | OUTPATIENT
Start: 2019-11-13 | End: 2019-11-15

## 2019-11-13 RX ORDER — ACETAMINOPHEN 325 MG/1
650 TABLET ORAL
Status: DISCONTINUED | OUTPATIENT
Start: 2019-11-13 | End: 2019-11-16 | Stop reason: HOSPADM

## 2019-11-13 RX ORDER — GUAIFENESIN 600 MG/1
1200 TABLET, EXTENDED RELEASE ORAL EVERY 12 HOURS
Status: DISCONTINUED | OUTPATIENT
Start: 2019-11-13 | End: 2019-11-16 | Stop reason: HOSPADM

## 2019-11-13 RX ORDER — ALBUTEROL SULFATE 0.83 MG/ML
2.5 SOLUTION RESPIRATORY (INHALATION)
Status: DISCONTINUED | OUTPATIENT
Start: 2019-11-13 | End: 2019-11-16 | Stop reason: HOSPADM

## 2019-11-13 RX ORDER — IPRATROPIUM BROMIDE AND ALBUTEROL SULFATE 2.5; .5 MG/3ML; MG/3ML
3 SOLUTION RESPIRATORY (INHALATION)
Status: DISCONTINUED | OUTPATIENT
Start: 2019-11-13 | End: 2019-11-16 | Stop reason: HOSPADM

## 2019-11-13 RX ADMIN — IPRATROPIUM BROMIDE AND ALBUTEROL SULFATE 3 ML: .5; 3 SOLUTION RESPIRATORY (INHALATION) at 16:15

## 2019-11-13 RX ADMIN — AMPICILLIN SODIUM AND SULBACTAM SODIUM 1.5 G: 1; .5 INJECTION, POWDER, FOR SOLUTION INTRAMUSCULAR; INTRAVENOUS at 14:31

## 2019-11-13 RX ADMIN — CEFEPIME 2 G: 20 INJECTION, POWDER, FOR SOLUTION INTRAVENOUS at 11:08

## 2019-11-13 RX ADMIN — SODIUM CHLORIDE 75 ML/HR: 450 INJECTION, SOLUTION INTRAVENOUS at 13:22

## 2019-11-13 RX ADMIN — IPRATROPIUM BROMIDE AND ALBUTEROL SULFATE 3 ML: .5; 3 SOLUTION RESPIRATORY (INHALATION) at 19:59

## 2019-11-13 RX ADMIN — CEFEPIME 2 G: 20 INJECTION, POWDER, FOR SOLUTION INTRAVENOUS at 04:21

## 2019-11-13 RX ADMIN — FAMOTIDINE 20 MG: 10 INJECTION, SOLUTION INTRAVENOUS at 08:02

## 2019-11-13 RX ADMIN — VANCOMYCIN HYDROCHLORIDE 1000 MG: 1 INJECTION, POWDER, LYOPHILIZED, FOR SOLUTION INTRAVENOUS at 07:30

## 2019-11-13 RX ADMIN — ALBUTEROL SULFATE 2.5 MG: 2.5 SOLUTION RESPIRATORY (INHALATION) at 13:25

## 2019-11-13 RX ADMIN — GUAIFENESIN 1200 MG: 600 TABLET, EXTENDED RELEASE ORAL at 13:23

## 2019-11-13 RX ADMIN — SODIUM CHLORIDE 125 ML/HR: 900 INJECTION, SOLUTION INTRAVENOUS at 01:55

## 2019-11-13 RX ADMIN — POTASSIUM PHOSPHATE, MONOBASIC AND POTASSIUM PHOSPHATE, DIBASIC: 224; 236 INJECTION, SOLUTION, CONCENTRATE INTRAVENOUS at 13:34

## 2019-11-13 RX ADMIN — AMPICILLIN SODIUM AND SULBACTAM SODIUM 1.5 G: 1; .5 INJECTION, POWDER, FOR SOLUTION INTRAMUSCULAR; INTRAVENOUS at 22:13

## 2019-11-13 RX ADMIN — GUAIFENESIN 1200 MG: 600 TABLET, EXTENDED RELEASE ORAL at 22:13

## 2019-11-13 RX ADMIN — QUETIAPINE FUMARATE 25 MG: 25 TABLET ORAL at 15:59

## 2019-11-13 RX ADMIN — ACETAMINOPHEN 650 MG: 325 TABLET ORAL at 13:23

## 2019-11-13 RX ADMIN — PREDNISONE 40 MG: 20 TABLET ORAL at 13:23

## 2019-11-13 RX ADMIN — SODIUM CHLORIDE 125 ML/HR: 900 INJECTION, SOLUTION INTRAVENOUS at 10:57

## 2019-11-13 RX ADMIN — ESCITALOPRAM 20 MG: 10 TABLET, FILM COATED ORAL at 13:23

## 2019-11-13 NOTE — PROGRESS NOTES
Problem: Pressure Injury - Risk of  Goal: *Prevention of pressure injury  Description  Document James Scale and appropriate interventions in the flowsheet. Outcome: Progressing Towards Goal  Note:   Pressure Injury Interventions:  Sensory Interventions: Assess changes in LOC, Keep linens dry and wrinkle-free, Minimize linen layers, Maintain/enhance activity level  Moisture Interventions: Maintain skin hydration (lotion/cream), Minimize layers  Activity Interventions: Increase time out of bed, Pressure redistribution bed/mattress(bed type)  Mobility Interventions: Pressure redistribution bed/mattress (bed type)  Nutrition Interventions: Document food/fluid/supplement intake  Friction and Shear Interventions: Apply protective barrier, creams and emollients  Patient able to turn and reposition self            Problem: Falls - Risk of  Goal: *Absence of Falls  Description  Document Odell Renetta Fall Risk and appropriate interventions in the flowsheet.   Outcome: Progressing Towards Goal  Note:   Fall Risk Interventions:  Mobility Interventions: Assess mobility with egress test, Communicate number of staff needed for ambulation/transfer  Mentation Interventions: Adequate sleep, hydration, pain control, Door open when patient unattended, Evaluate medications/consider consulting pharmacy, Eyeglasses and hearing aids, Familiar objects from home, Increase mobility, More frequent rounding, Reorient patient, Room close to nurse's station  Medication Interventions: Assess postural VS orthostatic hypotension, Evaluate medications/consider consulting pharmacy  Elimination Interventions: Bed/chair exit alarm, Call light in reach  Fall precautions are in place at this time

## 2019-11-13 NOTE — PROGRESS NOTES
General Surgery Daily Progress Note    Patient: Maynor Self MRN: [id number]  SSN: [id number]    YOB: 1975  Age: 53 y.o. Sex: male      Admit Date: 11/12/2019    Subjective:   Patient sleeping, arousable but goes right back to sleep. Per RN he has had no abdominal pain, is tolerating clear liquids. Current Facility-Administered Medications   Medication Dose Route Frequency    propofol (DIPRIVAN) infusion  0-50 mcg/kg/min IntraVENous TITRATE    0.9% sodium chloride infusion  125 mL/hr IntraVENous CONTINUOUS    glucose chewable tablet 16 g  4 Tab Oral PRN    glucagon (GLUCAGEN) injection 1 mg  1 mg IntraMUSCular PRN    dextrose 10% infusion 0-250 mL  0-250 mL IntraVENous PRN    enoxaparin (LOVENOX) injection 40 mg  40 mg SubCUTAneous Q24H    famotidine (PF) (PEPCID) 20 mg in sodium chloride 0.9% 10 mL injection  20 mg IntraVENous Q12H    albuterol-ipratropium (DUO-NEB) 2.5 MG-0.5 MG/3 ML  3 mL Nebulization Q4H PRN    cefepime (MAXIPIME) 2 g in 0.9% sodium chloride (MBP/ADV) 100 mL  2 g IntraVENous Q8H    vancomycin (VANCOCIN) 1,000 mg in 0.9% sodium chloride (MBP/ADV) 250 mL  1,000 mg IntraVENous Q8H    QUEtiapine (SEROquel) tablet 25 mg  25 mg Oral BID PRN        Objective:   11/13 0701 - 11/13 1900  In: 500 [I.V.:500]  Out: -   11/11 1901 - 11/13 0700  In: 5496 [P.O.:240;  I.V.:5256]  Out: 3531 [Urine:2578]  Patient Vitals for the past 8 hrs:   BP Temp Pulse Resp SpO2   11/13/19 0930 113/55  81 21 96 %   11/13/19 0900 104/51  66 24 97 %   11/13/19 0830 107/52  95 26 96 %   11/13/19 0800 113/68  75 18 96 %   11/13/19 0730 123/64  80 21 100 %   11/13/19 0700 114/71 98.5 °F (36.9 °C) 83 17 97 %   11/13/19 0659   77     11/13/19 0630 110/68  78 20 98 %   11/13/19 0600 120/71  68 21 98 %   11/13/19 0500 113/66  76 (!) 32 95 %   11/13/19 0400 113/64 98.5 °F (36.9 °C) 68 18 97 %   11/13/19 0300 120/58  65 17 96 %   11/13/19 0200 120/63  72 19 97 %       Physical Exam:  General: Somnolent, no distress  Lungs: Unlabored  Heart:  Regular rate and  rhythm  Abdomen: Soft, non-tender, non-distended  Extremities: Warm, moves all, no edema  Skin:  Warm and dry, no rash    Labs:   Recent Labs     11/13/19  0544   WBC 12.3*   HGB 11.2*   HCT 33.4*        Recent Labs     11/13/19  0544      K 3.6   *   CO2 23   GLU 92   BUN 9   CREA 0.98   CA 7.4*   MG 2.0   PHOS 1.8*   ALB 2.6*   TBILI 0.5   SGOT 20   ALT 19       Assessment / Plan:   · Acute respiratory failure  · Gastric and small bowel distention in imaging  · Clinically not obstructed, tolerating clears, abdomen benign  · Advance diet as tolerated

## 2019-11-13 NOTE — PROGRESS NOTES
[facility]  Pulmonary, Critical Care, and Sleep Medicine    Name: Francisco Rock MRN: [id number]   : 1975 Hospital: [address] [place] [address]   Date: 2019            IMPRESSION:   · Acute hypoxemic/hypercapneic respiratory failure, extubated   · Ams, ?secondary to overdose  · Ileus  · Hypothermia  · Possible aspiration pneumonia  · Acute asthma exacerbation / drug induced bronchospasm  · Polysubstance abuse (tobacco, amphetamines, cocaine, heroin)      RECOMMENDATIONS:   · Supplemental O2 as needed to keep sats > 88%  · Change IVF to 1/2 NS and decrease rate. Can stop altogether once taking adequate PO  · Deescalate abx to unasyn  · Follow cultures. Send sputum culture if able  · Check ABG  · Add scheduled duonebs, claritin, mucinex and flutter valve  · Add 5 day prednisone burst  · Check TSH  · Replete lytes  · Restart lexapro    DVT ppx: lovenox  GI ppx: not indicated. D/c pepcid  Advance diet         Subjective/History: This patient has been seen and evaluated at the request of Dr. Ilya Aguilar for acute respiratory failure. Patient is a 53 y.o. male who presented to the ED overnight in resp failure. By report, pt was found poorly responsive by EMS. Became combative after receiving narcan; given ketamine and intubated. Pt with + UDS as above. Pt currently intubated and sedated on propofol. Spoke with pt's parents. Mother saw him yesterday and indicated he had no complaints then. Pt has a h/o depression but she did find him depressed or upset yesterday.   They are unaware of any drug use.  + smoker; they believe rare etoh     - CT head: no acute process            - CT chest: L basilar asdz            - CT abd/pelvis: gastric and small bowel distension            - intubated, extubated    *all cultures NGTD    Interval history:  Normothermic  Extubated yesterday  sats 94% on 4L  Has diffuse rhonchi and wheeze  C/o being sore all over    Tells me that he was diagnosed w/ asthma at age 1. Has prn albuterol MDI/nebs at home which he uses 1x/week. +seasonal allergies (fall worst)  Denies post nasal drip or GERD  Smokes 1ppd    Past Medical History:   Diagnosis Date    ADD (attention deficit disorder)     Asthma     Depression       Past Surgical History:   Procedure Laterality Date    HX ORTHOPAEDIC      Elbow    HX TONSILLECTOMY            Prior to Admission medications    Medication Sig Start Date End Date Taking? Authorizing Provider   escitalopram oxalate (LEXAPRO) 20 mg tablet Take 20 mg by mouth daily. Yes Provider, Historical   diphenhydrAMINE (BENADRYL) 25 mg tablet Take 25 mg by mouth every six (6) hours as needed for Sleep (allergies). Yes Provider, Historical   albuterol (PROVENTIL HFA, VENTOLIN HFA, PROAIR HFA) 90 mcg/actuation inhaler Take 2 Puffs by inhalation every four (4) hours as needed for Wheezing or Shortness of Breath. 5/12/15  Yes Tesfaye Machado,    cetirizine (ZYRTEC) 10 mg tablet Take 10 mg by mouth daily as needed.    Yes Provider, Historical     Current Facility-Administered Medications   Medication Dose Route Frequency    ampicillin-sulbactam (UNASYN) 1.5 g in 0.9% sodium chloride (MBP/ADV) 50 mL  1.5 g IntraVENous Q6H    escitalopram oxalate (LEXAPRO) tablet 20 mg  20 mg Oral DAILY    predniSONE (DELTASONE) tablet 40 mg  40 mg Oral DAILY WITH BREAKFAST    albuterol-ipratropium (DUO-NEB) 2.5 MG-0.5 MG/3 ML  3 mL Nebulization Q4HWA RT    potassium phosphate 30 mmol in 0.9% sodium chloride 250 mL infusion   IntraVENous ONCE    0.45% sodium chloride infusion  75 mL/hr IntraVENous CONTINUOUS    guaiFENesin ER (MUCINEX) tablet 1,200 mg  1,200 mg Oral Q12H    enoxaparin (LOVENOX) injection 40 mg  40 mg SubCUTAneous Q24H     No Known Allergies   Social History     Tobacco Use    Smoking status: Former Smoker     Types: Cigarettes     Last attempt to quit: 2013     Years since quittin.2    Smokeless tobacco: Never Used   Substance Use Topics    Alcohol use: No      Family History   Problem Relation Age of Onset    Arthritis-osteo Maternal Grandmother     Heart Disease Maternal Grandmother         CABG/Stents    Cancer Maternal Grandfather         Unknown          Objective:   Vital Signs:    Visit Vitals  /66   Pulse 75   Temp 98.6 °F (37 °C)   Resp 23   Ht 5' 10\" (1.778 m)   Wt 70.3 kg (155 lb)   SpO2 95%   BMI 22.24 kg/m²       O2 Device: Nasal cannula   O2 Flow Rate (L/min): 4 l/min   Temp (24hrs), Av.5 °F (36.9 °C), Min:98.3 °F (36.8 °C), Max:98.6 °F (37 °C)       Intake/Output:   Last shift:       0701 -  1900  In: 1100 [I.V.:1100]  Out: 1000 [Urine:1000]  Last 3 shifts:  190 -  0700  In: 3791 [P.O.:240; I.V.:5256]  Out: 2748 [Urine:2578]    Intake/Output Summary (Last 24 hours) at 2019 1313  Last data filed at 2019 1300  Gross per 24 hour   Intake 4290 ml   Output 3220 ml   Net 1070 ml     Hemodynamics:   PAP:   CO:     Wedge:   CI:     CVP:    SVR:       PVR:       Ventilator Settings:  Mode Rate Tidal Volume Pressure FiO2 PEEP   Pressure support   450 ml  5 cm H2O 50 % 5 cm H20     Peak airway pressure: 1 cm H2O    Minute ventilation: 9.3 l/min      Physical Exam:    General:  Alert, awake   Head:  Normocephalic, without obvious abnormality, atraumatic. Eyes:  Conjunctivae/corneas clear. PERRL, EOMs intact. Nose: Nares normal. Septum midline. Mucosa normal. No drainage or sinus tenderness. Throat: Moist mucous membranes   Neck: Supple, symmetrical, trachea midline, no adenopathy, thyroid: no enlargment/tenderness/nodules, no carotid bruit and no JVD. Back:   Symmetric, no curvature. ROM normal.   Lungs:   Diffuse rhonchi and wheeze   Chest wall:  No tenderness or deformity. Heart:  Regular rate and rhythm, S1, S2 normal, no murmur, click, rub or gallop. Abdomen:   Soft, non-tender. Bowel sounds normal. No masses,  No organomegaly.    Extremities: Extremities normal, atraumatic, no cyanosis or edema. Pulses: 2+ and symmetric all extremities. Skin: Skin color, texture, turgor normal. No rashes or lesions   Lymph nodes:    Neurologic: Oriented x 3       Data:     Recent Results (from the past 24 hour(s))   GLUCOSE, POC    Collection Time: 11/12/19  6:11 PM   Result Value Ref Range    Glucose (POC) 121 (H) 65 - 100 mg/dL    Performed by Brenda Regan    CBC WITH AUTOMATED DIFF    Collection Time: 11/13/19  5:44 AM   Result Value Ref Range    WBC 12.3 (H) 4.1 - 11.1 K/uL    RBC 3.49 (L) 4.10 - 5.70 M/uL    HGB 11.2 (L) 12.1 - 17.0 g/dL    HCT 33.4 (L) 36.6 - 50.3 %    MCV 95.7 80.0 - 99.0 FL    MCH 32.1 26.0 - 34.0 PG    MCHC 33.5 30.0 - 36.5 g/dL    RDW 14.0 11.5 - 14.5 %    PLATELET 624 767 - 885 K/uL    MPV 9.6 8.9 - 12.9 FL    NRBC 0.0 0  WBC    ABSOLUTE NRBC 0.00 0.00 - 0.01 K/uL    NEUTROPHILS 77 (H) 32 - 75 %    LYMPHOCYTES 16 12 - 49 %    MONOCYTES 6 5 - 13 %    EOSINOPHILS 0 0 - 7 %    BASOPHILS 0 0 - 1 %    IMMATURE GRANULOCYTES 1 (H) 0.0 - 0.5 %    ABS. NEUTROPHILS 9.5 (H) 1.8 - 8.0 K/UL    ABS. LYMPHOCYTES 2.0 0.8 - 3.5 K/UL    ABS. MONOCYTES 0.8 0.0 - 1.0 K/UL    ABS. EOSINOPHILS 0.0 0.0 - 0.4 K/UL    ABS. BASOPHILS 0.0 0.0 - 0.1 K/UL    ABS. IMM. GRANS. 0.1 (H) 0.00 - 0.04 K/UL    DF AUTOMATED     METABOLIC PANEL, COMPREHENSIVE    Collection Time: 11/13/19  5:44 AM   Result Value Ref Range    Sodium 142 136 - 145 mmol/L    Potassium 3.6 3.5 - 5.1 mmol/L    Chloride 112 (H) 97 - 108 mmol/L    CO2 23 21 - 32 mmol/L    Anion gap 7 5 - 15 mmol/L    Glucose 92 65 - 100 mg/dL    BUN 9 6 - 20 MG/DL    Creatinine 0.98 0.70 - 1.30 MG/DL    BUN/Creatinine ratio 9 (L) 12 - 20      GFR est AA >60 >60 ml/min/1.73m2    GFR est non-AA >60 >60 ml/min/1.73m2    Calcium 7.4 (L) 8.5 - 10.1 MG/DL    Bilirubin, total 0.5 0.2 - 1.0 MG/DL    ALT (SGPT) 19 12 - 78 U/L    AST (SGOT) 20 15 - 37 U/L    Alk.  phosphatase 40 (L) 45 - 117 U/L    Protein, total 5.3 (L) 6.4 - 8.2 g/dL    Albumin 2.6 (L) 3.5 - 5.0 g/dL    Globulin 2.7 2.0 - 4.0 g/dL    A-G Ratio 1.0 (L) 1.1 - 2.2     PHOSPHORUS    Collection Time: 11/13/19  5:44 AM   Result Value Ref Range    Phosphorus 1.8 (L) 2.6 - 4.7 MG/DL   MAGNESIUM    Collection Time: 11/13/19  5:44 AM   Result Value Ref Range    Magnesium 2.0 1.6 - 2.4 mg/dL   Scot Graves    Collection Time: 11/13/19  5:44 AM   Result Value Ref Range    Vancomycin,trough 13.6 (H) 5.0 - 10.0 ug/mL    Reported dose date: NOT PROVIDED      Reported dose time: NOT PROVIDED      Reported dose: NOT PROVIDED UNITS   GLUCOSE, POC    Collection Time: 11/13/19 12:20 PM   Result Value Ref Range    Glucose (POC) 84 65 - 100 mg/dL    Performed by Leominster Mellow                  Imaging:  I have personally reviewed the patients radiographs and have reviewed the reports:         Elaine Robles MD

## 2019-11-13 NOTE — PROGRESS NOTES
Bedside and Verbal shift change report given to Cindy Kirkpatrick RN (oncoming nurse) by Johnson Grady (offgoing nurse). Report included the following information SBAR, Kardex, ED Summary, Intake/Output, MAR, Recent Results, Med Rec Status, Cardiac Rhythm NSR and Alarm Parameters . Primary Nurse Héctor Porras and Ezequiel Crum, RN performed a dual skin assessment on this patient No impairment noted  James score is 19  0700- Received patient resting, arouseable. AAOx4 able to make needs known to staff clearly. Respirations even easy unlabored, SOB noted upon exertion, Expiratory wheezing noted B/L upon auscultation. Abdomen soft slightly distended, Active BS noted x4 quadrants. No edema noted to UE or LE. NS running @ 125 per order. Denies discomfort currently. No s/s of distress noted. Tolerating clear sips, denies nausea/vomiting, awaiting to increase diet when surgery sees patient. Call bell in reach, bed locked in lowest position. 0730- Dr. Chanel Walton in to see patient. Patient states feeling Nuala Dilling lot better today. \"  0800- Patient resting at this time, no s/s of distress noted. Respirations even easy. 0900- Surgery HERBERT Koenig in to see patient, does not believe patient has any obstruction, tolerating clears, signing off on patient. 1000- Patient resting at this time. Patient has been urinating in urinal without difficulty. 1100- Titrating down nasal cannula to 4LPM, tolerating well. 1200- Tolerating 4L PM via nasal cannula well. Blood Glucose 84.  1300- Medication changes made by MD, providing now with PO prednisone to help wheezing, changing antibiotics, and repleating electrolytes. RT to repeat ABG to monitor Ph level. 5- Spoke to Ger in lab- already processing TSH level add on. 1400- Paitent resting, no s/s distress noted. 1500- Patient to move to Med Surg 5, patient to eat before leaving.   TRANSFER - OUT REPORT:    Verbal report given to Maren(name) on Nick sanchez transferred to [facility] 5(unit) for routine progression of care       Report consisted of patients Situation, Background, Assessment and   Recommendations(SBAR). Information from the following report(s) SBAR, Kardex, ED Summary, Procedure Summary, Intake/Output, MAR, Recent Results, Med Rec Status, Cardiac Rhythm NSR and Alarm Parameters  was reviewed with the receiving nurse. Lines:   Peripheral IV 11/12/19 Right Forearm (Active)   Site Assessment Clean, dry, & intact 11/13/2019  3:00 PM   Phlebitis Assessment 0 11/13/2019  3:00 PM   Infiltration Assessment 0 11/13/2019  3:00 PM   Dressing Status Clean, dry, & intact 11/13/2019  3:00 PM   Dressing Type Transparent 11/13/2019  3:00 PM   Hub Color/Line Status Green; Infusing 11/13/2019  3:00 PM   Action Taken Open ports on tubing capped 11/13/2019  3:00 PM   Alcohol Cap Used Yes 11/13/2019  3:00 PM       Peripheral IV 11/12/19 Left Antecubital (Active)   Site Assessment Clean, dry, & intact 11/13/2019  3:00 PM   Phlebitis Assessment 0 11/13/2019  3:00 PM   Infiltration Assessment 0 11/13/2019  3:00 PM   Dressing Status Clean, dry, & intact 11/13/2019  3:00 PM   Dressing Type Transparent 11/13/2019  3:00 PM   Hub Color/Line Status Green; Infusing 11/13/2019  3:00 PM   Action Taken Open ports on tubing capped 11/13/2019  3:00 PM   Alcohol Cap Used Yes 11/13/2019  3:00 PM        Opportunity for questions and clarification was provided.       Patient transported with:   O2 @ 4 liters RN PCT

## 2019-11-13 NOTE — PROGRESS NOTES
1900 Bedside and Verbal shift change report given to Darrell Conklin RN  (oncoming nurse) by Leena Roach RN  (offgoing nurse). Report included the following information SBAR, Kardex, Intake/Output, MAR, Accordion, Recent Results, Med Rec Status, Cardiac Rhythm normal sinus and Alarm Parameters . Primary Nurse Karmen Richard RN and Leena Roach RN, RN performed a dual skin assessment on this patient No impairment noted  James score is 19   2000 Assessment done at this time, patient resting at this time,no distress at this time, he is alert and oriented x4 follows all commands. Patient denies any pain at this time. 2200 Patient resting no distress noted  0000 Reassessment done at this time, patient bathed at this time and linen changed Patient denies any pain   0200 Patient resting at this time no distress noted  0400 Reassessment done at this time no changes, he is sleeping no distress noted. 0600 labs drawn at this time, patient sleeping easy to arouse  0700 Bedside and Verbal shift change [facility]  ([facility]) by Brina Cheng RN  (offgoing nurse). Report included the following information SBAR, Kardex, ED Summary, Intake/Output, MAR, Accordion, Recent Results, Med Rec Status and Cardiac Rhythm normal sinus.

## 2019-11-13 NOTE — PROGRESS NOTES
Bedside shift change report given to [address] [place] [address] (oncoming nurse) by Ely Collado (offgoing nurse). Report included the following information SBAR, Kardex, MAR and Recent Results.

## 2019-11-13 NOTE — PROGRESS NOTES
Daily Progress Note: 11/13/2019  Krystal Russell MD    Assessment/Plan:   1. Sepsis       - on Cefepime and continue Vancomycin       - await blood cultures and adjust antibiotics accordingly     2. AMS - ? Due to drugs - see UDS       -  Continue neuro checks     3. Small bowel obstruction       - Clears       - consulted general surgery     4. Acute respiratory failure with acute uncompensated primary respiratory/ metabolic acidosis       -  Now on NC at 6L     5. Severe metabolic acidosis- improving       - maintenance IV infusion     6.  Leukocytosis       -  Repeat CBC     7. Hypothermia- resolved         8. New onset type 2 diabetes mellitus uncontrolled - with nonketotic hyperosmolar diabetic syndrome       -  A1c ok       -  Monitor every 6 hours    9. Lactic acidosis       - continue IV fluid resuscitation     10. Cocaine / Meth/ THC/ polysubstance abuse         - would encourage drug cessation     11. Asthma exacerbation        -  Solumedrol IV and Duoneb treatments per Pul     VTE prophylaxis      - SCDs to BLEs     Problem List:  Problem List as of 11/13/2019 Date Reviewed: 9/3/2014          Codes Class Noted - Resolved    Small bowel obstruction ([facility] [address] ICD-10-CM: X39.390  ICD-9-CM: 560.9  11/12/2019 - Present        Sepsis ([facility] [address] ICD-10-CM: A41.9  ICD-9-CM: 038.9, 995.91  11/12/2019 - Present              Subjective:     53 y.o. male with past medical history of ADD, Asthma, depression presented to the ED via EMS with reported AMS (altered mental status). Patient is already ETT intubated and was placed on mechanical ventilator support on arrival to the ED this morning. As such, history was obtained per my review of ED and electronic medical records. Later some additional history was obtained from patient's next of kin- his mother. EMS was reportedly called by patient's girlfriend as she heard patient moaning. Patient reportedly was found on the floor unresponsive. Patient was noted to have coffee ground emesis in his mouth. Patient was reportedly given Ketamine per EMS. He was initially on a 100% non-re breather and then was endotracheally intubated on admission to the ED. Patient is now seen for admission for further evaluation and treatments. Per discussion with patient's mother, she saw the patient yesterday at which time he had no complaints. (Dr Wally Cockayne)    : Intubated and sedated. Discussed with pts parents and they report he did not seem depressed yesterday. Urine Drug screen with cocaine, meth and THC. :  Cultures neg so far. Tmax 98. He is awake and alert. Now on 6L O2 via NC. No c/o abd pain. Tolerating clears. Review of Systems:   A comprehensive review of systems was negative except for that written in the HPI. Objective:   Physical Exam:     Visit Vitals  /71   Pulse 83   Temp 98.5 °F (36.9 °C)   Resp 17   Ht 5' 10\" (1.778 m)   Wt 70.3 kg (155 lb)   SpO2 97%   BMI 22.24 kg/m²    O2 Flow Rate (L/min): 6 l/min O2 Device: Nasal cannula    Temp (24hrs), Av.3 °F (36.8 °C), Min:97.3 °F (36.3 °C), Max:98.6 °F (37 °C)    No intake/output data recorded.  1901 -  0700  In: 5371 [P.O.:240; I.V.:5131]  Out: 8438 [Urine:2578]    General:  Alert and oriented, appears stated age. Head:  Normocephalic, without obvious abnormality, atraumatic. Eyes:  Conjunctivae/corneas clear. EOMs appear intact. Neck: Supple, symmetrical, trachea midline, no adenopathy, thyroid: no enlargement/tenderness/nodules, no carotid bruit and no JVD. Lungs:   rhonchi bilaterally. Chest wall:  No tenderness or deformity. Heart:  Regular rate and rhythm, S1, S2 normal, no murmur, click, rub or gallop. Abdomen:   Soft, non-tender, mildly distended. Bowel sounds normal. No masses,  No organomegaly. Extremities: no cyanosis or edema. No calf tenderness or cords. Pulses: 2+ and symmetric all extremities.    Skin: Skin color, texture, turgor normal. No rashes    Neurologic: Alert and Oriented.  Answers questions appropriately, moving all extremities      Data Review:       Recent Days:  Recent Labs     11/12/19  0419   WBC 17.1*   HGB 15.0   HCT 47.8   *     Recent Labs     11/13/19  0544 11/12/19  0903 11/12/19  0419    142 136   K 3.6 4.0 3.7   * 115* 102   CO2 23 24 22   GLU 92 105* 476*   BUN 9 16 16   CREA 0.98 0.90 1.64*   CA 7.4* 6.9* 8.5   MG 2.0 2.2 2.8*   PHOS 1.8* 2.6  --    ALB 2.6*  --  3.9   TBILI 0.5  --  0.4   SGOT 20  --  22   ALT 19  --  27     Recent Labs     11/12/19  1232 11/12/19  1110 11/12/19  0645   PH 7.27* 7.24* 7.21*   PCO2 44 47* 53*   PO2 139* 110* 67*   HCO3 20* 19* 21*   FIO2 50 50 50       24 Hour Results:  Recent Results (from the past 24 hour(s))   GLUCOSE, POC    Collection Time: 11/12/19  7:59 AM   Result Value Ref Range    Glucose (POC) 119 (H) 65 - 100 mg/dL    Performed by [address] [place] [address], [facility]    Collection Time: 11/12/19  9:03 AM   Result Value Ref Range    Sodium 142 136 - 145 mmol/L    Potassium 4.0 3.5 - 5.1 mmol/L    Chloride 115 (H) 97 - 108 mmol/L    CO2 24 21 - 32 mmol/L    Anion gap 3 (L) 5 - 15 mmol/L    Glucose 105 (H) 65 - 100 mg/dL    BUN 16 6 - 20 MG/DL    Creatinine 0.90 0.70 - 1.30 MG/DL    BUN/Creatinine ratio 18 12 - 20      GFR est AA >60 >60 ml/min/1.73m2    GFR est non-AA >60 >60 ml/min/1.73m2    Calcium 6.9 (L) 8.5 - 10.1 MG/DL   MAGNESIUM    Collection Time: 11/12/19  9:03 AM   Result Value Ref Range    Magnesium 2.2 1.6 - 2.4 mg/dL   PHOSPHORUS    Collection Time: 11/12/19  9:03 AM   Result Value Ref Range    Phosphorus 2.6 2.6 - 4.7 MG/DL   HEMOGLOBIN A1C WITH EAG    Collection Time: 11/12/19  9:59 AM   Result Value Ref Range    Hemoglobin A1c 5.4 4.2 - 6.3 %    Est. average glucose 108 mg/dL   BLOOD GAS, ARTERIAL    Collection Time: 11/12/19 11:10 AM   Result Value Ref Range    pH 7.24 (LL) 7.35 - 7.45      PCO2 47 (H) 35.0 - 45.0 mmHg    PO2 110 (H) 80 - 100 mmHg    O2 SAT 97 92 - 97 %    BICARBONATE 19 (L) 22 - 26 mmol/L    BASE DEFICIT 8.1 mmol/L    O2 METHOD VENTILATOR      FIO2 50 %    PRESSURE SUPPORT 5.0      EPAP/CPAP/PEEP 5.0      Sample source ARTERIAL      SITE LEFT RADIAL      STEPHANIA'S TEST N/A      Critical value read back Gisele August MD    GLUCOSE, POC    Collection Time: 11/12/19 11:22 AM   Result Value Ref Range    Glucose (POC) 109 (H) 65 - 100 mg/dL    Performed by [address] [place] [address], ARTERIAL    Collection Time: 11/12/19 12:32 PM   Result Value Ref Range    pH 7.27 (L) 7.35 - 7.45      PCO2 44 35.0 - 45.0 mmHg    PO2 139 (H) 80 - 100 mmHg    O2 SAT 98 (H) 92 - 97 %    BICARBONATE 20 (L) 22 - 26 mmol/L    BASE DEFICIT 7.0 mmol/L    O2 METHOD VENTILATOR      FIO2 50 %    PRESSURE SUPPORT 5.0      EPAP/CPAP/PEEP 5.0      Sample source ARTERIAL      SITE RIGHT RADIAL      STEPHANIA'S TEST YES     GLUCOSE, POC    Collection Time: 11/12/19  6:11 PM   Result Value Ref Range    Glucose (POC) 121 (H) 65 - 100 mg/dL    Performed by Alexia Rodriguez    METABOLIC PANEL, COMPREHENSIVE    Collection Time: 11/13/19  5:44 AM   Result Value Ref Range    Sodium 142 136 - 145 mmol/L    Potassium 3.6 3.5 - 5.1 mmol/L    Chloride 112 (H) 97 - 108 mmol/L    CO2 23 21 - 32 mmol/L    Anion gap 7 5 - 15 mmol/L    Glucose 92 65 - 100 mg/dL    BUN 9 6 - 20 MG/DL    Creatinine 0.98 0.70 - 1.30 MG/DL    BUN/Creatinine ratio 9 (L) 12 - 20      GFR est AA >60 >60 ml/min/1.73m2    GFR est non-AA >60 >60 ml/min/1.73m2    Calcium 7.4 (L) 8.5 - 10.1 MG/DL    Bilirubin, total 0.5 0.2 - 1.0 MG/DL    ALT (SGPT) 19 12 - 78 U/L    AST (SGOT) 20 15 - 37 U/L    Alk.  phosphatase 40 (L) 45 - 117 U/L    Protein, total 5.3 (L) 6.4 - 8.2 g/dL    Albumin 2.6 (L) 3.5 - 5.0 g/dL    Globulin 2.7 2.0 - 4.0 g/dL    A-G Ratio 1.0 (L) 1.1 - 2.2     PHOSPHORUS    Collection Time: 11/13/19  5:44 AM   Result Value Ref Range    Phosphorus 1.8 (L) 2.6 - 4.7 MG/DL   MAGNESIUM    Collection Time: 11/13/19  5:44 AM   Result Value Ref Range    Magnesium 2.0 1.6 - 2.4 mg/dL   Yang Miller    Collection Time: 11/13/19  5:44 AM   Result Value Ref Range    Vancomycin,trough 13.6 (H) 5.0 - 10.0 ug/mL    Reported dose date: NOT PROVIDED      Reported dose time: NOT PROVIDED      Reported dose: NOT PROVIDED UNITS       Medications reviewed  Current Facility-Administered Medications   Medication Dose Route Frequency    propofol (DIPRIVAN) infusion  0-50 mcg/kg/min IntraVENous TITRATE    0.9% sodium chloride infusion  125 mL/hr IntraVENous CONTINUOUS    glucose chewable tablet 16 g  4 Tab Oral PRN    glucagon (GLUCAGEN) injection 1 mg  1 mg IntraMUSCular PRN    dextrose 10% infusion 0-250 mL  0-250 mL IntraVENous PRN    enoxaparin (LOVENOX) injection 40 mg  40 mg SubCUTAneous Q24H    famotidine (PF) (PEPCID) 20 mg in sodium chloride 0.9% 10 mL injection  20 mg IntraVENous Q12H    albuterol-ipratropium (DUO-NEB) 2.5 MG-0.5 MG/3 ML  3 mL Nebulization Q4H PRN    cefepime (MAXIPIME) 2 g in 0.9% sodium chloride (MBP/ADV) 100 mL  2 g IntraVENous Q8H    vancomycin (VANCOCIN) 1,000 mg in 0.9% sodium chloride (MBP/ADV) 250 mL  1,000 mg IntraVENous Q8H    QUEtiapine (SEROquel) tablet 25 mg  25 mg Oral BID PRN     Care Plan discussed with: Patient/Family and Nurse    Total time spent with patient: 30 minutes. Eloise Maddox MD

## 2019-11-13 NOTE — PROGRESS NOTES
[facility] Dosing Services: Antimicrobial Stewardship Progress Note    Consult for antibiotic dosing of vancomycin by Dr. Jacobo Fregoso  Pharmacist reviewed antibiotic appropriateness for 53year old , male  for indication of bacteremia/intra-abdominal  Day of Therapy 2    Plan:  Vancomycin therapy:  MD: 1000mg beth 8 hrs     Dose calculated to approximate a therapeutic trough of 15-20 mcg/mL. Last trough level  Date Dose & Interval Measured (mcg/mL) Extrapolated (mcg/mL)   ?11/13 ?1000mg q 8 ?13.2 ?12   ? ? ? ?   ? ? ? ? Plan for level: Will keep at 1000mg every 8 hrs since clinically improving and recheck level in 24 hrs since the pt is likely to accumulate give his age    Pharmacy to follow daily and will make changes to dose and/or frequency based on clinical status. Other Antimicrobial  (not dosed by pharmacist)   Cefepime 2gm IV every 8 hrs   Cultures     11/12: Blood- NGTD- pending  11/112: MRSA- Neg- pending   Serum Creatinine     Lab Results   Component Value Date/Time    Creatinine 0.98 11/13/2019 05:44 AM    Creatinine (POC) 1.4 (H) 11/12/2019 04:14 AM       Creatinine Clearance Estimated Creatinine Clearance: 93.7 mL/min (based on SCr of 0.98 mg/dL). Procalcitonin    Lab Results   Component Value Date/Time    Procalcitonin <0.1 11/12/2019 04:19 AM        Temp   98.5 °F (36.9 °C)    WBC   Lab Results   Component Value Date/Time    WBC 12.3 (H) 11/13/2019 05:44 AM       H/H   Lab Results   Component Value Date/Time    HGB 11.2 (L) 11/13/2019 05:44 AM      Platelets Lab Results   Component Value Date/Time    PLATELET 370 61/29/3913 05:44 AM            Pharmacist: Signed Love Swan

## 2019-11-13 NOTE — PROGRESS NOTES
Pt is being transferred to the floor. I left a list of IOP for substance abuse resources in pt.'s chart for pt if pt chooses to invest in treatment for his polysubstance abuse issues.     Nixon Norris

## 2019-11-13 NOTE — PROGRESS NOTES
TRANSFER - IN REPORT:    Verbal report received from Daina(name) on Francisco Malik  being received from ICU(unit) for routine progression of care      Report consisted of patients Situation, Background, Assessment and   Recommendations(SBAR). Information from the following report(s) SBAR, Kardex, [facility] and Recent Results was reviewed with the receiving nurse. Opportunity for questions and clarification was provided. Assessment completed upon patients arrival to unit and care assumed.

## 2019-11-14 ENCOUNTER — APPOINTMENT (OUTPATIENT)
Dept: GENERAL RADIOLOGY | Age: 44
DRG: 871 | End: 2019-11-14
Attending: FAMILY MEDICINE
Payer: COMMERCIAL

## 2019-11-14 LAB
ALBUMIN SERPL-MCNC: 2.8 G/DL (ref 3.5–5)
ANION GAP SERPL CALC-SCNC: 6 MMOL/L (ref 5–15)
BASOPHILS # BLD: 0 K/UL (ref 0–0.1)
BASOPHILS NFR BLD: 0 % (ref 0–1)
BUN SERPL-MCNC: 10 MG/DL (ref 6–20)
BUN/CREAT SERPL: 12 (ref 12–20)
CALCIUM SERPL-MCNC: 8 MG/DL (ref 8.5–10.1)
CHLORIDE SERPL-SCNC: 107 MMOL/L (ref 97–108)
CO2 SERPL-SCNC: 27 MMOL/L (ref 21–32)
CREAT SERPL-MCNC: 0.83 MG/DL (ref 0.7–1.3)
DIFFERENTIAL METHOD BLD: ABNORMAL
EOSINOPHIL # BLD: 0 K/UL (ref 0–0.4)
EOSINOPHIL NFR BLD: 0 % (ref 0–7)
ERYTHROCYTE [DISTWIDTH] IN BLOOD BY AUTOMATED COUNT: 13.5 % (ref 11.5–14.5)
GLUCOSE SERPL-MCNC: 109 MG/DL (ref 65–100)
HCT VFR BLD AUTO: 35.3 % (ref 36.6–50.3)
HGB BLD-MCNC: 11.7 G/DL (ref 12.1–17)
IMM GRANULOCYTES # BLD AUTO: 0.1 K/UL (ref 0–0.04)
IMM GRANULOCYTES NFR BLD AUTO: 0 % (ref 0–0.5)
LYMPHOCYTES # BLD: 1.5 K/UL (ref 0.8–3.5)
LYMPHOCYTES NFR BLD: 14 % (ref 12–49)
MAGNESIUM SERPL-MCNC: 2.4 MG/DL (ref 1.6–2.4)
MCH RBC QN AUTO: 31 PG (ref 26–34)
MCHC RBC AUTO-ENTMCNC: 33.1 G/DL (ref 30–36.5)
MCV RBC AUTO: 93.6 FL (ref 80–99)
MONOCYTES # BLD: 0.8 K/UL (ref 0–1)
MONOCYTES NFR BLD: 8 % (ref 5–13)
NEUTS SEG # BLD: 8.7 K/UL (ref 1.8–8)
NEUTS SEG NFR BLD: 78 % (ref 32–75)
NRBC # BLD: 0 K/UL (ref 0–0.01)
NRBC BLD-RTO: 0 PER 100 WBC
PHOSPHATE SERPL-MCNC: 2.2 MG/DL (ref 2.6–4.7)
PLATELET # BLD AUTO: 206 K/UL (ref 150–400)
PMV BLD AUTO: 9.6 FL (ref 8.9–12.9)
POTASSIUM SERPL-SCNC: 3.6 MMOL/L (ref 3.5–5.1)
RBC # BLD AUTO: 3.77 M/UL (ref 4.1–5.7)
SODIUM SERPL-SCNC: 140 MMOL/L (ref 136–145)
WBC # BLD AUTO: 11.1 K/UL (ref 4.1–11.1)

## 2019-11-14 PROCEDURE — 74011636637 HC RX REV CODE- 636/637: Performed by: INTERNAL MEDICINE

## 2019-11-14 PROCEDURE — 74011250637 HC RX REV CODE- 250/637: Performed by: PHYSICIAN ASSISTANT

## 2019-11-14 PROCEDURE — 74011250636 HC RX REV CODE- 250/636: Performed by: INTERNAL MEDICINE

## 2019-11-14 PROCEDURE — 83735 ASSAY OF MAGNESIUM: CPT

## 2019-11-14 PROCEDURE — 74011250637 HC RX REV CODE- 250/637: Performed by: FAMILY MEDICINE

## 2019-11-14 PROCEDURE — 74011000250 HC RX REV CODE- 250: Performed by: INTERNAL MEDICINE

## 2019-11-14 PROCEDURE — 74011250637 HC RX REV CODE- 250/637: Performed by: INTERNAL MEDICINE

## 2019-11-14 PROCEDURE — 94640 AIRWAY INHALATION TREATMENT: CPT

## 2019-11-14 PROCEDURE — 71045 X-RAY EXAM CHEST 1 VIEW: CPT

## 2019-11-14 PROCEDURE — 74011000258 HC RX REV CODE- 258: Performed by: FAMILY MEDICINE

## 2019-11-14 PROCEDURE — 36415 COLL VENOUS BLD VENIPUNCTURE: CPT

## 2019-11-14 PROCEDURE — 85025 COMPLETE CBC W/AUTO DIFF WBC: CPT

## 2019-11-14 PROCEDURE — 80069 RENAL FUNCTION PANEL: CPT

## 2019-11-14 PROCEDURE — 65270000029 HC RM PRIVATE

## 2019-11-14 PROCEDURE — 77010033678 HC OXYGEN DAILY

## 2019-11-14 PROCEDURE — 94760 N-INVAS EAR/PLS OXIMETRY 1: CPT

## 2019-11-14 PROCEDURE — 74011000258 HC RX REV CODE- 258: Performed by: INTERNAL MEDICINE

## 2019-11-14 RX ORDER — AMOXICILLIN AND CLAVULANATE POTASSIUM 875; 125 MG/1; MG/1
1 TABLET, FILM COATED ORAL EVERY 12 HOURS
Status: DISCONTINUED | OUTPATIENT
Start: 2019-11-14 | End: 2019-11-14

## 2019-11-14 RX ORDER — QUETIAPINE FUMARATE 25 MG/1
25 TABLET, FILM COATED ORAL
Status: DISCONTINUED | OUTPATIENT
Start: 2019-11-14 | End: 2019-11-16 | Stop reason: HOSPADM

## 2019-11-14 RX ORDER — AMOXICILLIN AND CLAVULANATE POTASSIUM 875; 125 MG/1; MG/1
1 TABLET, FILM COATED ORAL 2 TIMES DAILY WITH MEALS
Status: DISCONTINUED | OUTPATIENT
Start: 2019-11-14 | End: 2019-11-16 | Stop reason: HOSPADM

## 2019-11-14 RX ORDER — FLUTICASONE PROPIONATE 50 MCG
2 SPRAY, SUSPENSION (ML) NASAL DAILY
Status: DISCONTINUED | OUTPATIENT
Start: 2019-11-14 | End: 2019-11-16 | Stop reason: HOSPADM

## 2019-11-14 RX ORDER — BUTALBITAL, ACETAMINOPHEN AND CAFFEINE 50; 325; 40 MG/1; MG/1; MG/1
1 TABLET ORAL
Status: DISCONTINUED | OUTPATIENT
Start: 2019-11-14 | End: 2019-11-16 | Stop reason: HOSPADM

## 2019-11-14 RX ADMIN — QUETIAPINE FUMARATE 25 MG: 25 TABLET ORAL at 21:04

## 2019-11-14 RX ADMIN — GUAIFENESIN 1200 MG: 600 TABLET, EXTENDED RELEASE ORAL at 21:04

## 2019-11-14 RX ADMIN — BUTALBITAL, ACETAMINOPHEN, AND CAFFEINE 1 TABLET: 50; 325; 40 TABLET ORAL at 08:20

## 2019-11-14 RX ADMIN — IPRATROPIUM BROMIDE AND ALBUTEROL SULFATE 3 ML: .5; 3 SOLUTION RESPIRATORY (INHALATION) at 15:00

## 2019-11-14 RX ADMIN — PREDNISONE 40 MG: 20 TABLET ORAL at 08:12

## 2019-11-14 RX ADMIN — IPRATROPIUM BROMIDE AND ALBUTEROL SULFATE 3 ML: .5; 3 SOLUTION RESPIRATORY (INHALATION) at 07:57

## 2019-11-14 RX ADMIN — ESCITALOPRAM 20 MG: 10 TABLET, FILM COATED ORAL at 08:12

## 2019-11-14 RX ADMIN — LORATADINE 10 MG: 10 TABLET ORAL at 08:12

## 2019-11-14 RX ADMIN — BUTALBITAL, ACETAMINOPHEN, AND CAFFEINE 1 TABLET: 50; 325; 40 TABLET ORAL at 13:12

## 2019-11-14 RX ADMIN — SODIUM CHLORIDE 50 ML/HR: 450 INJECTION, SOLUTION INTRAVENOUS at 21:04

## 2019-11-14 RX ADMIN — GUAIFENESIN 1200 MG: 600 TABLET, EXTENDED RELEASE ORAL at 08:12

## 2019-11-14 RX ADMIN — FLUTICASONE PROPIONATE 2 SPRAY: 50 SPRAY, METERED NASAL at 10:04

## 2019-11-14 RX ADMIN — IPRATROPIUM BROMIDE AND ALBUTEROL SULFATE 3 ML: .5; 3 SOLUTION RESPIRATORY (INHALATION) at 11:24

## 2019-11-14 RX ADMIN — IPRATROPIUM BROMIDE AND ALBUTEROL SULFATE 3 ML: .5; 3 SOLUTION RESPIRATORY (INHALATION) at 20:23

## 2019-11-14 RX ADMIN — AMOXICILLIN AND CLAVULANATE POTASSIUM 1 TABLET: 875; 125 TABLET, FILM COATED ORAL at 16:49

## 2019-11-14 RX ADMIN — SODIUM CHLORIDE 75 ML/HR: 450 INJECTION, SOLUTION INTRAVENOUS at 01:21

## 2019-11-14 RX ADMIN — AMPICILLIN SODIUM AND SULBACTAM SODIUM 1.5 G: 1; .5 INJECTION, POWDER, FOR SOLUTION INTRAMUSCULAR; INTRAVENOUS at 04:20

## 2019-11-14 RX ADMIN — BUTALBITAL, ACETAMINOPHEN, AND CAFFEINE 1 TABLET: 50; 325; 40 TABLET ORAL at 17:49

## 2019-11-14 RX ADMIN — AMPICILLIN SODIUM AND SULBACTAM SODIUM 1.5 G: 1; .5 INJECTION, POWDER, FOR SOLUTION INTRAMUSCULAR; INTRAVENOUS at 08:12

## 2019-11-14 NOTE — PROGRESS NOTES
Bedside shift change report given to Drea Johnson RN (oncoming nurse) by Odette Lai RN (offgoing nurse). Report included the following information SBAR, Kardex, Procedure Summary, Intake/Output, MAR and Recent Results.

## 2019-11-14 NOTE — PROGRESS NOTES
Pt c/o headache, 10/10 pain, in forehead bilateral constant, sharp pain. Pt has also mentioned muffled hearing this a.m. Notified Dr. Nick Noel, who will see pt shortly to assess.

## 2019-11-14 NOTE — PROGRESS NOTES
Rounded on [employer] patients and provided Anointing of the Sick at request of patient    Fr. Kaci Murray

## 2019-11-14 NOTE — PROGRESS NOTES
[facility]  Pulmonary, Critical Care, and Sleep Medicine    Name: Yaa Schilling MRN: [id number]   : 1975 Hospital: [address] [facility]   Date: 2019        Progress Note    IMPRESSION:   · Acute hypoxemic/hypercapneic respiratory failure, extubated   · Ams, ?secondary to overdose  · Ileus  · Hypothermia  · Possible aspiration pneumonia  · Acute asthma exacerbation / drug induced bronchospasm  · Polysubstance abuse (tobacco, amphetamines, cocaine, heroin)      RECOMMENDATIONS:   · Supplemental O2 as needed to keep sats > 88%  · Continue gentle IVF 1/2 NS. Can stop altogether once taking adequate PO  · Switch Unasyn to po Augmentin to complete 7 day course. Discussed with pharmacy. · F/u CXR  · Follow cultures. Send sputum culture if able  · Continue scheduled duonebs, claritin, mucinex and flutter valve  · Continue 5 day prednisone burst  · Continue home lexapro; d/c Seroquel  · Lovenox       Subjective/History: This patient has been seen and evaluated at the request of Dr. Lilly Steinberg for acute respiratory failure. Patient is a 53 y.o. male who presented to the ED overnight in resp failure. By report, pt was found poorly responsive by EMS. Became combative after receiving narcan; given ketamine and intubated. Pt with + UDS as above. Pt currently intubated and sedated on propofol. Spoke with pt's parents. Mother saw him yesterday and indicated he had no complaints then. Pt has a h/o depression but she did find him depressed or upset yesterday. They are unaware of any drug use.  + smoker; they believe rare etoh     - CT head: no acute process            - CT chest: L basilar asdz            - CT abd/pelvis: gastric and small bowel distension            - intubated, extubated    *all cultures NGTD     Tells me that he was diagnosed w/ asthma at age 1. Has prn albuterol MDI/nebs at home which he uses 1x/week.   +seasonal allergies (fall worst)  Denies post nasal drip or GERD  Smokes 1ppd    Interval history:  Afebrile  Sats 90-91% on 5L  WBC 11.1 - better  Hgb 11.7  TSH 0.32  Blood cx no growth x 2 days  11/13 ABG 7.45/31/49/21 on 4L    ROS: States he feels terrible. Reports SOB. Reports HA. Reports cough and chest congestion. States he has sweats and chills. Denies abd pain. Denies LE pain/swelling. Past Medical History:   Diagnosis Date    ADD (attention deficit disorder)     Asthma     Depression       Past Surgical History:   Procedure Laterality Date    HX ORTHOPAEDIC      Elbow    HX TONSILLECTOMY      1985      Prior to Admission medications    Medication Sig Start Date End Date Taking? Authorizing Provider   escitalopram oxalate (LEXAPRO) 20 mg tablet Take 20 mg by mouth daily. Yes Provider, Historical   diphenhydrAMINE (BENADRYL) 25 mg tablet Take 25 mg by mouth every six (6) hours as needed for Sleep (allergies). Yes Provider, Historical   albuterol (PROVENTIL HFA, VENTOLIN HFA, PROAIR HFA) 90 mcg/actuation inhaler Take 2 Puffs by inhalation every four (4) hours as needed for Wheezing or Shortness of Breath. 5/12/15  Yes Jayashree De La Rosa, DO   cetirizine (ZYRTEC) 10 mg tablet Take 10 mg by mouth daily as needed.    Yes Provider, Historical     Current Facility-Administered Medications   Medication Dose Route Frequency    fluticasone propionate (FLONASE) 50 mcg/actuation nasal spray 2 Spray  2 Spray Both Nostrils DAILY    ampicillin-sulbactam (UNASYN) 1.5 g in 0.9% sodium chloride (MBP/ADV) 50 mL  1.5 g IntraVENous Q6H    escitalopram oxalate (LEXAPRO) tablet 20 mg  20 mg Oral DAILY    predniSONE (DELTASONE) tablet 40 mg  40 mg Oral DAILY WITH BREAKFAST    albuterol-ipratropium (DUO-NEB) 2.5 MG-0.5 MG/3 ML  3 mL Nebulization Q4HWA RT    0.45% sodium chloride infusion  50 mL/hr IntraVENous CONTINUOUS    guaiFENesin ER (MUCINEX) tablet 1,200 mg  1,200 mg Oral Q12H    loratadine (CLARITIN) tablet 10 mg  10 mg Oral DAILY    enoxaparin (LOVENOX) injection 40 mg  40 mg SubCUTAneous Q24H     No Known Allergies   Social History     Tobacco Use    Smoking status: Former Smoker     Types: Cigarettes     Last attempt to quit: 2013     Years since quittin.2    Smokeless tobacco: Never Used   Substance Use Topics    Alcohol use: No      Family History   Problem Relation Age of Onset    Arthritis-osteo Maternal Grandmother     Heart Disease Maternal Grandmother         CABG/Stents    Cancer Maternal Grandfather         Unknown          Objective:   Vital Signs:    Visit Vitals  /58   Pulse 73   Temp 98.5 °F (36.9 °C)   Resp 18   Ht 5' 10\" (1.778 m)   Wt 72.9 kg (160 lb 11.2 oz)   SpO2 91%   BMI 23.06 kg/m²       O2 Device: Nasal cannula   O2 Flow Rate (L/min): 5 l/min   Temp (24hrs), Av.5 °F (36.9 °C), Min:98.2 °F (36.8 °C), Max:98.9 °F (37.2 °C)       Intake/Output:   Last shift:       0701 -  1900  In: -   Out: 1100 [Urine:1100]  Last 3 shifts:  1901 -  0700  In: 3790.7 [P.O.:300; I.V.:3490.7]  Out: 4200 [Urine:4200]    Intake/Output Summary (Last 24 hours) at 2019 1019  Last data filed at 2019 0710  Gross per 24 hour   Intake 850.67 ml   Output 2750 ml   Net -1899.33 ml       Physical Exam:    General:  Alert, awake   Head:  Normocephalic, without obvious abnormality, atraumatic. Eyes:  Conjunctivae/corneas clear. EOMs intact. Nose: Nares normal. Septum midline. Mucosa normal. No drainage or sinus tenderness. Throat: Moist mucous membranes   Neck: Supple, symmetrical, trachea midline, no adenopathy. Lungs:   Diffuse rhonchi and wheeze   Chest wall:  No tenderness or deformity. Heart:  Regular rate and rhythm, S1, S2 normal, no murmur, click, rub or gallop. Abdomen:   Soft, non-tender. Bowel sounds normal. No masses,  No organomegaly. Extremities: Extremities normal, atraumatic, no cyanosis or edema. Pulses: 2+ and symmetric all extremities.    Skin: Skin color, texture, turgor normal. No rashes or lesions   Lymph nodes: Cervical and clavicular nodes normal.   Neurologic: Oriented x 3       Data:     Recent Results (from the past 24 hour(s))   GLUCOSE, POC    Collection Time: 11/13/19 12:20 PM   Result Value Ref Range    Glucose (POC) 84 65 - 100 mg/dL    Performed by Israel Mckenzie    BLOOD GAS, ARTERIAL    Collection Time: 11/13/19  1:38 PM   Result Value Ref Range    pH 7.45 7.35 - 7.45      PCO2 31 (L) 35.0 - 45.0 mmHg    PO2 49 (LL) 80 - 100 mmHg    O2 SAT 87 (L) 92 - 97 %    BICARBONATE 21 (L) 22 - 26 mmol/L    BASE DEFICIT 1.6 mmol/L    O2 METHOD NASAL O2      O2 FLOW RATE 4.00 L/min    Sample source ARTERIAL      SITE RIGHT RADIAL      STEPHANIA'S TEST YES      Critical value read back kane vallecillo md    GLUCOSE, POC    Collection Time: 11/13/19  5:34 PM   Result Value Ref Range    Glucose (POC) 113 (H) 65 - 100 mg/dL    Performed by Shamar Yanez    CBC WITH AUTOMATED DIFF    Collection Time: 11/14/19  3:39 AM   Result Value Ref Range    WBC 11.1 4.1 - 11.1 K/uL    RBC 3.77 (L) 4.10 - 5.70 M/uL    HGB 11.7 (L) 12.1 - 17.0 g/dL    HCT 35.3 (L) 36.6 - 50.3 %    MCV 93.6 80.0 - 99.0 FL    MCH 31.0 26.0 - 34.0 PG    MCHC 33.1 30.0 - 36.5 g/dL    RDW 13.5 11.5 - 14.5 %    PLATELET 111 122 - 525 K/uL    MPV 9.6 8.9 - 12.9 FL    NRBC 0.0 0  WBC    ABSOLUTE NRBC 0.00 0.00 - 0.01 K/uL    NEUTROPHILS 78 (H) 32 - 75 %    LYMPHOCYTES 14 12 - 49 %    MONOCYTES 8 5 - 13 %    EOSINOPHILS 0 0 - 7 %    BASOPHILS 0 0 - 1 %    IMMATURE GRANULOCYTES 0 0.0 - 0.5 %    ABS. NEUTROPHILS 8.7 (H) 1.8 - 8.0 K/UL    ABS. LYMPHOCYTES 1.5 0.8 - 3.5 K/UL    ABS. MONOCYTES 0.8 0.0 - 1.0 K/UL    ABS. EOSINOPHILS 0.0 0.0 - 0.4 K/UL    ABS. BASOPHILS 0.0 0.0 - 0.1 K/UL    ABS. IMM.  GRANS. 0.1 (H) 0.00 - 0.04 K/UL    DF AUTOMATED     RENAL FUNCTION PANEL    Collection Time: 11/14/19  3:39 AM   Result Value Ref Range    Sodium 140 136 - 145 mmol/L    Potassium 3.6 3.5 - 5.1 mmol/L Chloride 107 97 - 108 mmol/L    CO2 27 21 - 32 mmol/L    Anion gap 6 5 - 15 mmol/L    Glucose 109 (H) 65 - 100 mg/dL    BUN 10 6 - 20 MG/DL    Creatinine 0.83 0.70 - 1.30 MG/DL    BUN/Creatinine ratio 12 12 - 20      GFR est AA >60 >60 ml/min/1.73m2    GFR est non-AA >60 >60 ml/min/1.73m2    Calcium 8.0 (L) 8.5 - 10.1 MG/DL    Phosphorus 2.2 (L) 2.6 - 4.7 MG/DL    Albumin 2.8 (L) 3.5 - 5.0 g/dL   MAGNESIUM    Collection Time: 11/14/19  3:39 AM   Result Value Ref Range    Magnesium 2.4 1.6 - 2.4 mg/dL                 Imaging:  I have personally reviewed the patients radiographs and have reviewed the reports:  CXR 11/13:  Left basilar atelectasis/consolidation with small left effusion. Minimal parenchymal opacity in the right.        Alverto Mejía, [address]

## 2019-11-14 NOTE — PROGRESS NOTES
[address] [facility] visit. Mr. Dulce Nageotte sound asleep. Did not wake upon entering room. Prayer offered for him. He is Baptist. Unable to complete a spiritual assessment.     AP Duarte, RN, ACSW, LCSW   Page:  [phone]

## 2019-11-14 NOTE — PROGRESS NOTES
Daily Progress Note: 11/14/2019  Darwin Cedeno MD    Assessment/Plan:   1. Sepsis       - on Unasym now       - blood cultures pending     2. AMS - ? Due to drugs - see UDS       -  Continue neuro checks     3. Small bowel obstruction       - Clears and advance as tolerated       - consulted general surgery     4. Acute respiratory failure with acute uncompensated primary respiratory/ metabolic acidosis       - Now on NC at 4-6L       - Prednisone burst     5. Severe metabolic acidosis- improving       - maintenance IV infusion     6.  Leukocytosis       -  Repeat CBC     7. Hypothermia- resolved         8. New onset type 2 diabetes mellitus uncontrolled - with nonketotic hyperosmolar diabetic syndrome       -  A1c ok       -  Monitor every 6 hours    9. Lactic acidosis       - continue IV fluid resuscitation     10. Cocaine / Meth/ THC/ polysubstance abuse         - would encourage drug cessation     11. Asthma exacerbation        -  Solumedrol IV and Duoneb treatments per Pul    12   Depression        - On Lexapro        - Psych consult    13. Headache       - Fioricet        - Flonase     VTE prophylaxis      - SCDs to BLEs     Problem List:  Problem List as of 11/14/2019 Date Reviewed: 9/3/2014          Codes Class Noted - Resolved    Small bowel obstruction ([facility] [address] ICD-10-CM: Z54.745  ICD-9-CM: 560.9  11/12/2019 - Present        Sepsis ([facility] [address] ICD-10-CM: A41.9  ICD-9-CM: 038.9, 995.91  11/12/2019 - Present              Subjective:     53 y.o. male with past medical history of ADD, Asthma, depression presented to the ED via EMS with reported AMS (altered mental status). Patient is already ETT intubated and was placed on mechanical ventilator support on arrival to the ED this morning. As such, history was obtained per my review of ED and electronic medical records. Later some additional history was obtained from patient's next of kin- his mother.  EMS was reportedly called by patient's girlfriend as she heard patient moaning. Patient reportedly was found on the floor unresponsive. Patient was noted to have coffee ground emesis in his mouth. Patient was reportedly given Ketamine per EMS. He was initially on a 100% non-re breather and then was endotracheally intubated on admission to the ED. Patient is now seen for admission for further evaluation and treatments. Per discussion with patient's mother, she saw the patient yesterday at which time he had no complaints. (Dr Ghada Durham)    : Intubated and sedated. Discussed with pts parents and they report he did not seem depressed yesterday. Urine Drug screen with cocaine, meth and THC. :  Cultures neg so far. Tmax 98. He is awake and alert. Now on 6L O2 via NC. No c/o abd pain. Tolerating clears. : BC pending. Still wheezing and rhonchorus. On 4-6 L oxygen via NC. He is c/o increasing depression and social isolation. Has a poor social support system. His mom is helping him financially. He is worried that he may not have a job any longer. He reports after a break up 5 years ago his life went down and he lost his career and things are not getting better. He was seeing a woman and that is who we was with when he tried the cocaine. He reports this was a one time incident. Has been on Adderral for ADHD. Has tried to go to counseling the past but has not been able to start. Is agreeable to seeing psych while here. He is c/o frontal headache and sinus congestion. Review of Systems:   A comprehensive review of systems was negative except for that written in the HPI.     Objective:   Physical Exam:     Visit Vitals  /73 (BP 1 Location: Left arm, BP Patient Position: At rest)   Pulse 62   Temp 98.4 °F (36.9 °C)   Resp 19   Ht 5' 10\" (1.778 m)   Wt 160 lb 11.2 oz (72.9 kg)   SpO2 90%   BMI 23.06 kg/m²    O2 Flow Rate (L/min): 5 l/min O2 Device: Nasal cannula    Temp (24hrs), Av.5 °F (36.9 °C), Min:98.2 °F (36.8 °C), Max:98.9 °F (37.2 °C)    No intake/output data recorded. 11/12 1901 - 11/14 0700  In: 3790.7 [P.O.:300; I.V.:3490.7]  Out: 4200 [Urine:4200]    General:  Alert and oriented, appears stated age. Head:  Normocephalic, without obvious abnormality, atraumatic. Eyes:  Conjunctivae/corneas clear. EOMs appear intact. Neck: Supple, symmetrical, trachea midline, no adenopathy, thyroid: no enlargement/tenderness/nodules, no carotid bruit and no JVD. Lungs:   rhonchi bilaterally. Chest wall:  No tenderness or deformity. Heart:  Regular rate and rhythm, S1, S2 normal, no murmur, click, rub or gallop. Abdomen:   Soft, non-tender, mildly distended. Bowel sounds normal. No masses,  No organomegaly. Extremities: no cyanosis or edema. No calf tenderness or cords. Pulses: 2+ and symmetric all extremities. Skin: Skin color, texture, turgor normal. No rashes    Neurologic: Alert and Oriented.  Answers questions appropriately, moving all extremities      Data Review:       Recent Days:  Recent Labs     11/14/19  0339 11/13/19  0544 11/12/19  0419   WBC 11.1 12.3* 17.1*   HGB 11.7* 11.2* 15.0   HCT 35.3* 33.4* 47.8    198 423*     Recent Labs     11/14/19  0339 11/13/19  0544 11/12/19  0903 11/12/19  0419    142 142 136   K 3.6 3.6 4.0 3.7    112* 115* 102   CO2 27 23 24 22   * 92 105* 476*   BUN 10 9 16 16   CREA 0.83 0.98 0.90 1.64*   CA 8.0* 7.4* 6.9* 8.5   MG 2.4 2.0 2.2 2.8*   PHOS 2.2* 1.8* 2.6  --    ALB 2.8* 2.6*  --  3.9   TBILI  --  0.5  --  0.4   SGOT  --  20  --  22   ALT  --  19  --  27     Recent Labs     11/13/19  1338 11/12/19  1232 11/12/19  1110 11/12/19  0645   PH 7.45 7.27* 7.24* 7.21*   PCO2 31* 44 47* 53*   PO2 49* 139* 110* 67*   HCO3 21* 20* 19* 21*   FIO2  --  50 50 50       24 Hour Results:  Recent Results (from the past 24 hour(s))   GLUCOSE, POC    Collection Time: 11/13/19 12:20 PM   Result Value Ref Range    Glucose (POC) 84 65 - 100 mg/dL Performed by Nette Brink    BLOOD GAS, ARTERIAL    Collection Time: 11/13/19  1:38 PM   Result Value Ref Range    pH 7.45 7.35 - 7.45      PCO2 31 (L) 35.0 - 45.0 mmHg    PO2 49 (LL) 80 - 100 mmHg    O2 SAT 87 (L) 92 - 97 %    BICARBONATE 21 (L) 22 - 26 mmol/L    BASE DEFICIT 1.6 mmol/L    O2 METHOD NASAL O2      O2 FLOW RATE 4.00 L/min    Sample source ARTERIAL      SITE RIGHT RADIAL      STEPHANIA'S TEST YES      Critical value read back kane vallecillo md    GLUCOSE, POC    Collection Time: 11/13/19  5:34 PM   Result Value Ref Range    Glucose (POC) 113 (H) 65 - 100 mg/dL    Performed by Rico Melara    CBC WITH AUTOMATED DIFF    Collection Time: 11/14/19  3:39 AM   Result Value Ref Range    WBC 11.1 4.1 - 11.1 K/uL    RBC 3.77 (L) 4.10 - 5.70 M/uL    HGB 11.7 (L) 12.1 - 17.0 g/dL    HCT 35.3 (L) 36.6 - 50.3 %    MCV 93.6 80.0 - 99.0 FL    MCH 31.0 26.0 - 34.0 PG    MCHC 33.1 30.0 - 36.5 g/dL    RDW 13.5 11.5 - 14.5 %    PLATELET 425 356 - 525 K/uL    MPV 9.6 8.9 - 12.9 FL    NRBC 0.0 0  WBC    ABSOLUTE NRBC 0.00 0.00 - 0.01 K/uL    NEUTROPHILS 78 (H) 32 - 75 %    LYMPHOCYTES 14 12 - 49 %    MONOCYTES 8 5 - 13 %    EOSINOPHILS 0 0 - 7 %    BASOPHILS 0 0 - 1 %    IMMATURE GRANULOCYTES 0 0.0 - 0.5 %    ABS. NEUTROPHILS 8.7 (H) 1.8 - 8.0 K/UL    ABS. LYMPHOCYTES 1.5 0.8 - 3.5 K/UL    ABS. MONOCYTES 0.8 0.0 - 1.0 K/UL    ABS. EOSINOPHILS 0.0 0.0 - 0.4 K/UL    ABS. BASOPHILS 0.0 0.0 - 0.1 K/UL    ABS. IMM.  GRANS. 0.1 (H) 0.00 - 0.04 K/UL    DF AUTOMATED     RENAL FUNCTION PANEL    Collection Time: 11/14/19  3:39 AM   Result Value Ref Range    Sodium 140 136 - 145 mmol/L    Potassium 3.6 3.5 - 5.1 mmol/L    Chloride 107 97 - 108 mmol/L    CO2 27 21 - 32 mmol/L    Anion gap 6 5 - 15 mmol/L    Glucose 109 (H) 65 - 100 mg/dL    BUN 10 6 - 20 MG/DL    Creatinine 0.83 0.70 - 1.30 MG/DL    BUN/Creatinine ratio 12 12 - 20      GFR est AA >60 >60 ml/min/1.73m2    GFR est non-AA >60 >60 ml/min/1.73m2    Calcium 8.0 (L) 8.5 - 10.1 MG/DL    Phosphorus 2.2 (L) 2.6 - 4.7 MG/DL    Albumin 2.8 (L) 3.5 - 5.0 g/dL   MAGNESIUM    Collection Time: 11/14/19  3:39 AM   Result Value Ref Range    Magnesium 2.4 1.6 - 2.4 mg/dL       Medications reviewed  Current Facility-Administered Medications   Medication Dose Route Frequency    butalbital-acetaminophen-caffeine (FIORICET, ESGIC) -40 mg per tablet 1 Tab  1 Tab Oral Q4H PRN    fluticasone propionate (FLONASE) 50 mcg/actuation nasal spray 2 Spray  2 Spray Both Nostrils DAILY    acetaminophen (TYLENOL) tablet 650 mg  650 mg Oral Q4H PRN    ampicillin-sulbactam (UNASYN) 1.5 g in 0.9% sodium chloride (MBP/ADV) 50 mL  1.5 g IntraVENous Q6H    escitalopram oxalate (LEXAPRO) tablet 20 mg  20 mg Oral DAILY    predniSONE (DELTASONE) tablet 40 mg  40 mg Oral DAILY WITH BREAKFAST    albuterol-ipratropium (DUO-NEB) 2.5 MG-0.5 MG/3 ML  3 mL Nebulization Q4HWA RT    albuterol (PROVENTIL VENTOLIN) nebulizer solution 2.5 mg  2.5 mg Nebulization Q4H PRN    0.45% sodium chloride infusion  50 mL/hr IntraVENous CONTINUOUS    guaiFENesin ER (MUCINEX) tablet 1,200 mg  1,200 mg Oral Q12H    loratadine (CLARITIN) tablet 10 mg  10 mg Oral DAILY    enoxaparin (LOVENOX) injection 40 mg  40 mg SubCUTAneous Q24H    QUEtiapine (SEROquel) tablet 25 mg  25 mg Oral BID PRN     Care Plan discussed with: Patient/Family and Nurse    Total time spent with patient: 30 minutes. Eloise Maddox MD

## 2019-11-14 NOTE — PROGRESS NOTES
Bedside shift change report given to Jimbo Roy (oncoming nurse) by Cathy Brown (offgoing nurse). Report included the following information SBAR, Kardex, MAR and Recent Results.

## 2019-11-14 NOTE — PROGRESS NOTES
11- CASE MANAGEMENT NOTE:  I met with the patient to determine potential discharge needs. He lives alone in a one level house with 4 entry steps. He has been independent with his ADL's, uses no assistive devices, worked full-time and drove. He stated he became addicted to the prescription drug Adderall that he takes for ADHD. He has also abused other non-prescription drugs in the past. I gave him information on both in-patient and out-patient substance abuse programs and strongly encouraged him to look into getting help. His PCP is Dr. Odell Reid who has no nurse navigator. He had prescription drug coverage and uses [facility] [address]. He said his mother, Cassy Carlin ([phone]) may transport him home when discharged.     1, Continue to follow and assist as needed    Alia Lin, BSW, CM

## 2019-11-14 NOTE — PROGRESS NOTES
General Surgery    Patient out of ICU  On regular diet  As noted yesterday, no obstruction  We will see again as needed.

## 2019-11-14 NOTE — PROGRESS NOTES
Bedside shift change report given to eDan (oncoming nurse) by Yamil Lynch (offgoing nurse). Report included the following information SBAR, Kardex, MAR and Recent Results.

## 2019-11-14 NOTE — CONSULTS
PSYCHIATRY CONSULT NOTE:    REASON FOR CONSULT: Depression and substance use      HISTORY OF PRESENTING COMPLAINT:  Yadi Shrestha is a 53 y.o. WHITE OR  male who is currently admitted to the medical floor at [facility] after an accidental overdose. He reports that his life \"imploded 4 years ago\" and he has had difficulties with Adderall, cocaine, and THC since then. He also reports remote use of ETOH. He is taking lexapro and reports that it is helpful for his depression when he does not take Adderall. He states that he has not been able to find a therapist in the area. He is not a fully reliable historian. He confirm that the overdose was not a suicide attempt. He denies suicidal ideation at this time. He reports 9/10 anxiety \"all day, everyday. \"    PAST PSYCHIATRIC HISTORY and SUBSTANCE ABUSE HISTORY:  Depression, anxiety  Polysubstance abuse      PAST MEDICAL HISTORY:  Please see H&P for details. Past Medical History:   Diagnosis Date    ADD (attention deficit disorder)     Asthma     Depression            Lab Results   Component Value Date/Time    WBC 11.1 11/14/2019 03:39 AM    HGB 11.7 (L) 11/14/2019 03:39 AM    Hematocrit (POC) 48 11/12/2019 04:14 AM    HCT 35.3 (L) 11/14/2019 03:39 AM    PLATELET 025 22/39/1113 03:39 AM    MCV 93.6 11/14/2019 03:39 AM      Lab Results   Component Value Date/Time    Sodium 140 11/14/2019 03:39 AM    Potassium 3.6 11/14/2019 03:39 AM    Chloride 107 11/14/2019 03:39 AM    CO2 27 11/14/2019 03:39 AM    Anion gap 6 11/14/2019 03:39 AM    Glucose 109 (H) 11/14/2019 03:39 AM    BUN 10 11/14/2019 03:39 AM    Creatinine 0.83 11/14/2019 03:39 AM    BUN/Creatinine ratio 12 11/14/2019 03:39 AM    GFR est AA >60 11/14/2019 03:39 AM    GFR est non-AA >60 11/14/2019 03:39 AM    Calcium 8.0 (L) 11/14/2019 03:39 AM    Bilirubin, total 0.5 11/13/2019 05:44 AM    AST (SGOT) 20 11/13/2019 05:44 AM    Alk.  phosphatase 40 (L) 11/13/2019 05:44 AM    Protein, total 5.3 (L) 11/13/2019 05:44 AM    Albumin 2.8 (L) 11/14/2019 03:39 AM    Globulin 2.7 11/13/2019 05:44 AM    A-G Ratio 1.0 (L) 11/13/2019 05:44 AM    ALT (SGPT) 19 11/13/2019 05:44 AM          PSYCHOSOCIAL HISTORY:  Lives by himself      MENTAL STATUS EXAM:    General appearance: appropriate  Eye contact: Avoids eye contact  Speech: Spontaneous  Affect : Depressed  Mood: \"bad \"  Thought Process: Logical, goal directed  Perception: Denies AH or VH. Thought Content: SI or Plan  Insight: Partial  Judgement: Fair  Cognition: Intact grossly. ASSESSMENT AND PLAN:  Mary Lou Martínez meets criteria for a diagnosis of generalized anxiety disorder. He does not currently meet inpatient psychiatric criteria. He would benefit from a partial hospitalization program but is not interested at this time. He certainly needs outpatient follow up. Continue Lexapro 20mg daily    Thank you for the consultation    ROSA Garland  11/14/19   .

## 2019-11-15 LAB
ALBUMIN SERPL-MCNC: 2.7 G/DL (ref 3.5–5)
ANION GAP SERPL CALC-SCNC: 5 MMOL/L (ref 5–15)
BASOPHILS # BLD: 0 K/UL (ref 0–0.1)
BASOPHILS NFR BLD: 0 % (ref 0–1)
BUN SERPL-MCNC: 15 MG/DL (ref 6–20)
BUN/CREAT SERPL: 16 (ref 12–20)
CALCIUM SERPL-MCNC: 8.1 MG/DL (ref 8.5–10.1)
CHLORIDE SERPL-SCNC: 110 MMOL/L (ref 97–108)
CO2 SERPL-SCNC: 27 MMOL/L (ref 21–32)
CREAT SERPL-MCNC: 0.96 MG/DL (ref 0.7–1.3)
DIFFERENTIAL METHOD BLD: ABNORMAL
EOSINOPHIL # BLD: 0 K/UL (ref 0–0.4)
EOSINOPHIL NFR BLD: 0 % (ref 0–7)
ERYTHROCYTE [DISTWIDTH] IN BLOOD BY AUTOMATED COUNT: 13.4 % (ref 11.5–14.5)
GLUCOSE SERPL-MCNC: 97 MG/DL (ref 65–100)
HCT VFR BLD AUTO: 36.9 % (ref 36.6–50.3)
HGB BLD-MCNC: 12.4 G/DL (ref 12.1–17)
IMM GRANULOCYTES # BLD AUTO: 0.1 K/UL (ref 0–0.04)
IMM GRANULOCYTES NFR BLD AUTO: 0 % (ref 0–0.5)
LYMPHOCYTES # BLD: 2.2 K/UL (ref 0.8–3.5)
LYMPHOCYTES NFR BLD: 18 % (ref 12–49)
MAGNESIUM SERPL-MCNC: 2.2 MG/DL (ref 1.6–2.4)
MCH RBC QN AUTO: 31.3 PG (ref 26–34)
MCHC RBC AUTO-ENTMCNC: 33.6 G/DL (ref 30–36.5)
MCV RBC AUTO: 93.2 FL (ref 80–99)
MONOCYTES # BLD: 0.7 K/UL (ref 0–1)
MONOCYTES NFR BLD: 6 % (ref 5–13)
NEUTS SEG # BLD: 9.1 K/UL (ref 1.8–8)
NEUTS SEG NFR BLD: 76 % (ref 32–75)
NRBC # BLD: 0 K/UL (ref 0–0.01)
NRBC BLD-RTO: 0 PER 100 WBC
PHOSPHATE SERPL-MCNC: 2.5 MG/DL (ref 2.6–4.7)
PLATELET # BLD AUTO: 237 K/UL (ref 150–400)
PMV BLD AUTO: 9.6 FL (ref 8.9–12.9)
POTASSIUM SERPL-SCNC: 3.2 MMOL/L (ref 3.5–5.1)
RBC # BLD AUTO: 3.96 M/UL (ref 4.1–5.7)
SODIUM SERPL-SCNC: 142 MMOL/L (ref 136–145)
WBC # BLD AUTO: 12.2 K/UL (ref 4.1–11.1)

## 2019-11-15 PROCEDURE — 74011250637 HC RX REV CODE- 250/637: Performed by: PHYSICIAN ASSISTANT

## 2019-11-15 PROCEDURE — 74011250637 HC RX REV CODE- 250/637: Performed by: FAMILY MEDICINE

## 2019-11-15 PROCEDURE — 94640 AIRWAY INHALATION TREATMENT: CPT

## 2019-11-15 PROCEDURE — 80069 RENAL FUNCTION PANEL: CPT

## 2019-11-15 PROCEDURE — 85025 COMPLETE CBC W/AUTO DIFF WBC: CPT

## 2019-11-15 PROCEDURE — 74011636637 HC RX REV CODE- 636/637: Performed by: INTERNAL MEDICINE

## 2019-11-15 PROCEDURE — 83735 ASSAY OF MAGNESIUM: CPT

## 2019-11-15 PROCEDURE — 65270000029 HC RM PRIVATE

## 2019-11-15 PROCEDURE — 74011000250 HC RX REV CODE- 250: Performed by: INTERNAL MEDICINE

## 2019-11-15 PROCEDURE — 36415 COLL VENOUS BLD VENIPUNCTURE: CPT

## 2019-11-15 PROCEDURE — 74011250637 HC RX REV CODE- 250/637: Performed by: INTERNAL MEDICINE

## 2019-11-15 RX ORDER — POTASSIUM CHLORIDE 750 MG/1
20 TABLET, FILM COATED, EXTENDED RELEASE ORAL
Status: COMPLETED | OUTPATIENT
Start: 2019-11-15 | End: 2019-11-15

## 2019-11-15 RX ORDER — DOCUSATE SODIUM 100 MG/1
100 CAPSULE, LIQUID FILLED ORAL 2 TIMES DAILY
Status: DISCONTINUED | OUTPATIENT
Start: 2019-11-15 | End: 2019-11-16 | Stop reason: HOSPADM

## 2019-11-15 RX ORDER — ESCITALOPRAM OXALATE 10 MG/1
20 TABLET ORAL DAILY
Status: DISCONTINUED | OUTPATIENT
Start: 2019-11-15 | End: 2019-11-16 | Stop reason: HOSPADM

## 2019-11-15 RX ADMIN — BUTALBITAL, ACETAMINOPHEN, AND CAFFEINE 1 TABLET: 50; 325; 40 TABLET ORAL at 04:01

## 2019-11-15 RX ADMIN — GUAIFENESIN 1200 MG: 600 TABLET, EXTENDED RELEASE ORAL at 21:58

## 2019-11-15 RX ADMIN — LORATADINE 10 MG: 10 TABLET ORAL at 08:48

## 2019-11-15 RX ADMIN — PREDNISONE 40 MG: 20 TABLET ORAL at 08:40

## 2019-11-15 RX ADMIN — IPRATROPIUM BROMIDE AND ALBUTEROL SULFATE 3 ML: .5; 3 SOLUTION RESPIRATORY (INHALATION) at 15:03

## 2019-11-15 RX ADMIN — IPRATROPIUM BROMIDE AND ALBUTEROL SULFATE 3 ML: .5; 3 SOLUTION RESPIRATORY (INHALATION) at 07:44

## 2019-11-15 RX ADMIN — DOCUSATE SODIUM 100 MG: 100 CAPSULE, LIQUID FILLED ORAL at 12:37

## 2019-11-15 RX ADMIN — ESCITALOPRAM OXALATE 20 MG: 10 TABLET ORAL at 17:40

## 2019-11-15 RX ADMIN — QUETIAPINE FUMARATE 25 MG: 25 TABLET ORAL at 21:58

## 2019-11-15 RX ADMIN — IPRATROPIUM BROMIDE AND ALBUTEROL SULFATE 3 ML: .5; 3 SOLUTION RESPIRATORY (INHALATION) at 22:00

## 2019-11-15 RX ADMIN — QUETIAPINE FUMARATE 25 MG: 25 TABLET ORAL at 08:48

## 2019-11-15 RX ADMIN — GUAIFENESIN 1200 MG: 600 TABLET, EXTENDED RELEASE ORAL at 08:48

## 2019-11-15 RX ADMIN — ACETAMINOPHEN 650 MG: 325 TABLET ORAL at 15:21

## 2019-11-15 RX ADMIN — BUTALBITAL, ACETAMINOPHEN, AND CAFFEINE 1 TABLET: 50; 325; 40 TABLET ORAL at 08:35

## 2019-11-15 RX ADMIN — AMOXICILLIN AND CLAVULANATE POTASSIUM 1 TABLET: 875; 125 TABLET, FILM COATED ORAL at 08:48

## 2019-11-15 RX ADMIN — ESCITALOPRAM 20 MG: 10 TABLET, FILM COATED ORAL at 17:39

## 2019-11-15 RX ADMIN — IPRATROPIUM BROMIDE AND ALBUTEROL SULFATE 3 ML: .5; 3 SOLUTION RESPIRATORY (INHALATION) at 11:12

## 2019-11-15 RX ADMIN — POTASSIUM CHLORIDE 20 MEQ: 750 TABLET, FILM COATED, EXTENDED RELEASE ORAL at 12:37

## 2019-11-15 RX ADMIN — DOCUSATE SODIUM 100 MG: 100 CAPSULE, LIQUID FILLED ORAL at 17:39

## 2019-11-15 RX ADMIN — BUTALBITAL, ACETAMINOPHEN, AND CAFFEINE 1 TABLET: 50; 325; 40 TABLET ORAL at 21:58

## 2019-11-15 RX ADMIN — BUTALBITAL, ACETAMINOPHEN, AND CAFFEINE 1 TABLET: 50; 325; 40 TABLET ORAL at 13:42

## 2019-11-15 RX ADMIN — AMOXICILLIN AND CLAVULANATE POTASSIUM 1 TABLET: 875; 125 TABLET, FILM COATED ORAL at 17:38

## 2019-11-15 RX ADMIN — FLUTICASONE PROPIONATE 2 SPRAY: 50 SPRAY, METERED NASAL at 08:48

## 2019-11-15 RX ADMIN — ESCITALOPRAM 20 MG: 10 TABLET, FILM COATED ORAL at 08:48

## 2019-11-15 NOTE — PROGRESS NOTES
2031  Pt c/o anxiety over work-related issues and requesting medication. States he had seroquel \"two days ago\" and that he would like more. Seroquel order was d/c'd yesterday. Dr. Julia Apple notified, MD gave order for seroquel 25mg BID PRN for anxiety.

## 2019-11-15 NOTE — PROGRESS NOTES
Bedside and Verbal shift change report given to Theodore Zaragoza (oncoming nurse) by Melony Christensen RN (offgoing nurse). Report included the following information SBAR, Kardex, Intake/Output, MAR and Recent Results.

## 2019-11-15 NOTE — PROGRESS NOTES
1652. Patient Advocate was in with patient discussing concerns patient voiced. NO information is to be relayed to anyone about the patient. All inquiries about the patient's status are to be addressed by the patient himself.    4906. Called Dr. Kelsey Tang regarding patient wanting his  home medications restarted. 1751. Called Dr. Aster Nelson to inform her the patient states he's leaving the hospital tonight. \"I'll sign whatever papers I have to but I'm going home. \" Liliya Gomez in talking with patient as this time. Will keep Dr. Aster Nelson updated.

## 2019-11-15 NOTE — PROGRESS NOTES
Nutrition Assessment:    RECOMMENDATIONS/INTERVENTION(S):   1. Continue regular diet. 2. K+ low, recommend repletion. 3. Continue to monitor po intakes, wt changes, labs. ASSESSMENT:   11/15: Pt assessed for LOS. Admitted with sepsis, SBO. PMH includes polysubstance abuse. BMI 23, WNL/ Pt reports good appetite. Says he has been eating most of his meals. Lunch tray in room 75% eaten. Says he has never been a Yun eater\" but he thinks he is eating normally for him. Reports no recent wt changes. Says UBW is ~160#, CBW in chart 160#. No c/o n/v/d/c. K+ 3.2 low, recommend repletion. Labs- K 3.2, phos 2.5, Ca 8.1. Meds- prednisone. Diet Order: Regular  % Eaten:    Patient Vitals for the past 72 hrs:   % Diet Eaten   11/13/19 1817 10 %       Pertinent Medications: [x] Reviewed    Labs: [x] Reviewed    Anthropometrics: Height: 5' 10\" (177.8 cm) Weight: 72.9 kg (160 lb 11.2 oz)    IBW (%IBW):   ( ) UBW (%UBW):   (  %)      BMI: Body mass index is 23.06 kg/m². This BMI is indicative of:   [] Underweight    [x] Normal    [] Overweight    []  Obesity    []  Extreme Obesity (BMI>40)  Estimated Nutrition Needs (Based on): 2101 Kcals/day(1616 x 1.3 AF) , 73 g(0.8-1 g/kg) Protein  Carbohydrate: At Least 130 g/day  Fluids: 2101 mL/day (1 ml/kcal)    Last BM: 11/11   [x]Active     []Hyperactive  []Hypoactive       [] Absent   BS  Skin:    [x] Intact   [] Incision  [] Breakdown   [] DTI   [] Tears/Excoriation/Abrasion  []Edema [] Other:      Wt Readings from Last 30 Encounters:   11/13/19 72.9 kg (160 lb 11.2 oz)   05/12/15 74.8 kg (165 lb)   09/03/14 76.5 kg (168 lb 9.6 oz)      NUTRITION DIAGNOSES:   Problem:  No nutritional diagnosis at this time     Etiology: related to       Signs/Symptoms: as evidenced by        NUTRITION INTERVENTIONS:  Meals/Snacks: General/healthful diet                  GOAL:   PO intake >75% meals next 5-7 days    Cultural, Rastafari, or Ethnic Dietary Needs: None     EDUCATION & DISCHARGE NEEDS:    [x] None Identified   [] Identified and Education Provided/Documented   [] Identified and Pt declined/was not appropriate      [] Interdisciplinary Care Plan Reviewed/Documented    [x] Discharge Needs: Regular diet   [] No Nutrition Related Discharge Needs    NUTRITION RISK:   Pt Is At Nutrition Risk  [x]     No Nutrition Risk Identified  []       PT SEEN FOR:    []  MD Consult: []Calorie Count      []Diabetic Diet Education        []Diet Education     []Electrolyte Management     []General Nutrition Management and Supplements     []Management of Tube Feeding     []TPN Recommendations    []  RN Referral:  []MST score >=2     []Enteral/Parenteral Nutrition PTA     []Pregnant: Gestational DM or Multigestation                 [] Pressure Ulcer    []  Low BMI      [x]  Length of Stay       [] Dysphagia Diet         [] Ventilator  []  Follow-up     Previous Recommendations:   [] Implemented          [] Not Implemented          [x] Not Applicable    Previous Goal:   [] Met              [] Progressing Towards Goal              [] Not Progressing Towards Goal   [x] Not Applicable            Sarah Gaspar, [address] [facility]  Pager [phone]  Phone [phone]

## 2019-11-15 NOTE — PROGRESS NOTES
Spiritual Care Assessment/Progress Note  [address]      NAME: Mak Oneill      MRN: [id number]  AGE: 53 y.o.  SEX: male  Taoist Affiliation: Evangelical   Language: English     11/15/2019     Total Time (in minutes): 70     Spiritual Assessment begun in [facility]  MED SURG 2 through conversation with:         [x]Patient        [] Family    [] Friend(s)        Reason for Consult: Request by staff     Spiritual beliefs: (Please include comment if needed)     [x] Identifies with a tracee tradition:  Christine Chance       [] Supported by a tracee community:            [] Claims no spiritual orientation:           [] Seeking spiritual identity:                [] Adheres to an individual form of spirituality:           [] Not able to assess:                           Identified resources for coping:      [x] Prayer                               [] Music                  [] Guided Imagery     [] Family/friends                 [] Pet visits     [x] Devotional reading                         [] Unknown     [] Other:                                              Interventions offered during this visit: (See comments for more details)    Patient Interventions: Affirmation of emotions/emotional suffering, Affirmation of tracee, Prayer (actual), Initial/Spiritual assessment, patient floor, Iconic (affirming the presence of God/Higher Power), Coping skills reviewed/reinforced, Catharsis/review of pertinent events in supportive environment           Plan of Care:     [] Support spiritual and/or cultural needs    [] Support AMD and/or advance care planning process      [] Support grieving process   [] Coordinate Rites and/or Rituals    [] Coordination with community clergy   [] No spiritual needs identified at this time   [] Detailed Plan of Care below (See Comments)  [] Make referral to Music Therapy  [] Make referral to Pet Therapy     [] Make referral to Addiction services  [] Make referral to [facility]  [] Make referral to Spiritual Care Partner  [] No future visits requested        [x] Follow up visits as needed     Comments: I provided pastoral support to Mr. Wilver Mcdonnell per request of staff. Mr. Wilver Mcdonnell was tearful as he shared about his challenges. Nakita is a very significant coping resource for him. We read scripture together and prayed. I provided a presence of encouragement, affirmation, and support and he expressed appreciation for the visit. Spiritual Care remains available as needed. Rev. rBidget Finley M.Div, [facility]

## 2019-11-15 NOTE — PROGRESS NOTES
Daily Progress Note: 11/15/2019  Taty Ferrer MD    Assessment/Plan:   1. Sepsis       - on Unasym initially - switched to Augmentin       - blood cultures pending     2. AMS - ? Due to drugs - see UDS       -  resolved     3. Small bowel obstruction - resolved     4. Acute respiratory failure with acute uncompensated primary respiratory/ metabolic acidosis       - Now on NC at 4-6L       - Prednisone po - wean as able     5. Severe metabolic acidosis- resolved     6.  Leukocytosis       -  monitor     7. Hypothermia- resolved         8. New onset type 2 diabetes mellitus uncontrolled - with nonketotic hyperosmolar diabetic syndrome       -  A1c ok       -  Monitor every 6 hours    9. Lactic acidosis       -resolved     10. Cocaine / Meth/ THC/ polysubstance abuse         - would encourage drug cessation         - psy consulted and has little to add    11. Asthma exacerbation        -  Solumedrol IV initially and then to po Pred        -  Duoneb treatments per Pul    12   Depression        - On Lexapro        - Psych consulted and has little to add    13. Headache       - Fioricet        - Flonase    14. Hypokalemia       - monitor and replace     VTE prophylaxis      - SCDs to BLEs     Problem List:  Problem List as of 11/15/2019 Date Reviewed: 9/3/2014          Codes Class Noted - Resolved    Small bowel obstruction ([facility] [address] ICD-10-CM: P12.005  ICD-9-CM: 560.9  11/12/2019 - Present        Sepsis ([facility] [address] ICD-10-CM: A41.9  ICD-9-CM: 038.9, 995.91  11/12/2019 - Present              Subjective:     53 y.o. male with past medical history of ADD, Asthma, depression presented to the ED via EMS with reported AMS (altered mental status). Patient is already ETT intubated and was placed on mechanical ventilator support on arrival to the ED this morning. As such, history was obtained per my review of ED and electronic medical records.   Later some additional history was obtained from patient's next of kin- his mother. EMS was reportedly called by patient's girlfriend as she heard patient moaning. Patient reportedly was found on the floor unresponsive. Patient was noted to have coffee ground emesis in his mouth. Patient was reportedly given Ketamine per EMS. He was initially on a 100% non-re breather and then was endotracheally intubated on admission to the ED. Patient is now seen for admission for further evaluation and treatments. Per discussion with patient's mother, she saw the patient yesterday at which time he had no complaints. (Dr Abril Huizar)    11/12: Intubated and sedated. Discussed with pts parents and they report he did not seem depressed yesterday. Urine Drug screen with cocaine, meth and THC. 11/13:  Cultures neg so far. Tmax 98. He is awake and alert. Now on 6L O2 via NC. No c/o abd pain. Tolerating clears. 11/14: BC pending. Still wheezing and rhonchorus. On 4-6 L oxygen via NC. He is c/o increasing depression and social isolation. Has a poor social support system. His mom is helping him financially. He is worried that he may not have a job any longer. He reports after a break up 5 years ago his life went down and he lost his career and things are not getting better. He was seeing a woman and that is who we was with when he tried the cocaine. He reports this was a one time incident. Has been on Adderral for ADHD. Has tried to go to counseling the past but has not been able to start. Is agreeable to seeing psych while here. He is c/o frontal headache and sinus congestion. 11/15:  Feeling better he reports but still requiring O2 to keep sats up. Still wheezing quite a bit. On Prednisone. Potassium a little low - replacing - may be due to nebs. Cultures remain neg. Psy has seen and has nothing to add. Sinus congestion better. Review of Systems:   A comprehensive review of systems was negative except for that written in the HPI.     Objective: Physical Exam:   Visit Vitals  /65 (BP 1 Location: Right arm, BP Patient Position: At rest)   Pulse 68   Temp 98.1 °F (36.7 °C)   Resp 18   Ht 5' 10\" (1.778 m)   Wt 72.9 kg (160 lb 11.2 oz)   SpO2 97%   BMI 23.06 kg/m²    O2 Flow Rate (L/min): 5 l/min O2 Device: Nasal cannula  Temp (24hrs), Av.2 °F (36.8 °C), Min:98 °F (36.7 °C), Max:98.5 °F (36.9 °C)    1901 - 11/15 0700  In: 118 [P.O.:118]  Out: 209 [Urine:870]   701 - 1900  In: 1475.7 [P.O.:60; I.V.:1415.7]  Out: 8189 [Urine:3950]  General:  Alert and oriented, appears stated age. Head:  Normocephalic, without obvious abnormality, atraumatic. Eyes:  Conjunctivae/corneas clear. EOMs appear intact. Neck: Supple, symmetrical, trachea midline, no adenopathy, thyroid: no enlargement/tenderness/nodules, no carotid bruit and no JVD. Lungs:   rhonchi bilaterally, inspir and expir wheezes. Chest wall:  No tenderness or deformity. Heart:  Regular rate and rhythm, S1, S2 normal, no murmur, click, rub or gallop. Abdomen:   Soft, non-tender, mildly distended. Bowel sounds normal. No masses,  No organomegaly. Extremities: no cyanosis or edema. No calf tenderness or cords. Pulses: 2+ and symmetric all extremities. Skin: turgor normal. No rashes    Neurologic: Alert and Oriented. Answers questions appropriately, moving all extremities      Data Review:    19  EXAM: CT CHEST ABD PELV W CONT  Clinical history: Vomiting, coffee-ground emesis  INDICATION: distended abdomen, vomiting coffee ground  COMPARISON: None  FINDINGS:   THYROID: No nodule. MEDIASTINUM: No mass or lymphadenopathy. NEIL: No mass or lymphadenopathy. THORACIC AORTA: No dissection or aneurysm. MAIN PULMONARY ARTERY: Normal in caliber. TRACHEA/BRONCHI: ET tube in place. ESOPHAGUS: NG tube tip in the gastric body. HEART: Normal in size. PLEURA: Left basilar atelectasis/consolidation. LUNGS: ET tube in place.  Small nodule in the right lung measures 5 mm in size. LIVER: No mass or biliary dilatation. GALLBLADDER: Unremarkable. SPLEEN: No mass. PANCREAS: No mass or ductal dilatation. ADRENALS: Unremarkable. KIDNEYS: No mass, calculus, or hydronephrosis. STOMACH: There is gastric distention. SMALL BOWEL: There is extensive small bowel distention as well. Transition in  the pelvis. COLON: Fecal stasis. APPENDIX: Unremarkable. PERITONEUM: No ascites or pneumoperitoneum. RETROPERITONEUM: No lymphadenopathy or aortic aneurysm. REPRODUCTIVE ORGANS:  URINARY BLADDER: Partida catheter in the bladder. BONES: No destructive bone lesion. ADDITIONAL COMMENTS: N/A  IMPRESSION:  Imaging findings are consistent with small bowel obstruction. Gastric and small bowel distention with transition point in the pelvis. Left basilar atelectasis/consolidation.   ET tube and NG tube in place    Recent Days:  Recent Labs     11/15/19  0349 11/14/19  0339 11/13/19  0544   WBC 12.2* 11.1 12.3*   HGB 12.4 11.7* 11.2*   HCT 36.9 35.3* 33.4*    206 198     Recent Labs     11/15/19  0349 11/14/19  0339 11/13/19  0544    140 142   K 3.2* 3.6 3.6   * 107 112*   CO2 27 27 23   GLU 97 109* 92   BUN 15 10 9   CREA 0.96 0.83 0.98   CA 8.1* 8.0* 7.4*   MG 2.2 2.4 2.0   PHOS 2.5* 2.2* 1.8*   ALB 2.7* 2.8* 2.6*   TBILI  --   --  0.5   SGOT  --   --  20   ALT  --   --  19     Recent Labs     11/13/19  1338 11/12/19  1232 11/12/19  1110 11/12/19  0645   PH 7.45 7.27* 7.24* 7.21*   PCO2 31* 44 47* 53*   PO2 49* 139* 110* 67*   HCO3 21* 20* 19* 21*   FIO2  --  50 50 50     24 Hour Results:  Recent Results (from the past 24 hour(s))   CBC WITH AUTOMATED DIFF    Collection Time: 11/15/19  3:49 AM   Result Value Ref Range    WBC 12.2 (H) 4.1 - 11.1 K/uL    RBC 3.96 (L) 4.10 - 5.70 M/uL    HGB 12.4 12.1 - 17.0 g/dL    HCT 36.9 36.6 - 50.3 %    MCV 93.2 80.0 - 99.0 FL    MCH 31.3 26.0 - 34.0 PG    MCHC 33.6 30.0 - 36.5 g/dL    RDW 13.4 11.5 - 14.5 %    PLATELET 534 953 - 698 K/uL    MPV 9.6 8.9 - 12.9 FL    NRBC 0.0 0  WBC    ABSOLUTE NRBC 0.00 0.00 - 0.01 K/uL    NEUTROPHILS 76 (H) 32 - 75 %    LYMPHOCYTES 18 12 - 49 %    MONOCYTES 6 5 - 13 %    EOSINOPHILS 0 0 - 7 %    BASOPHILS 0 0 - 1 %    IMMATURE GRANULOCYTES 0 0.0 - 0.5 %    ABS. NEUTROPHILS 9.1 (H) 1.8 - 8.0 K/UL    ABS. LYMPHOCYTES 2.2 0.8 - 3.5 K/UL    ABS. MONOCYTES 0.7 0.0 - 1.0 K/UL    ABS. EOSINOPHILS 0.0 0.0 - 0.4 K/UL    ABS. BASOPHILS 0.0 0.0 - 0.1 K/UL    ABS. IMM.  GRANS. 0.1 (H) 0.00 - 0.04 K/UL    DF AUTOMATED     RENAL FUNCTION PANEL    Collection Time: 11/15/19  3:49 AM   Result Value Ref Range    Sodium 142 136 - 145 mmol/L    Potassium 3.2 (L) 3.5 - 5.1 mmol/L    Chloride 110 (H) 97 - 108 mmol/L    CO2 27 21 - 32 mmol/L    Anion gap 5 5 - 15 mmol/L    Glucose 97 65 - 100 mg/dL    BUN 15 6 - 20 MG/DL    Creatinine 0.96 0.70 - 1.30 MG/DL    BUN/Creatinine ratio 16 12 - 20      GFR est AA >60 >60 ml/min/1.73m2    GFR est non-AA >60 >60 ml/min/1.73m2    Calcium 8.1 (L) 8.5 - 10.1 MG/DL    Phosphorus 2.5 (L) 2.6 - 4.7 MG/DL    Albumin 2.7 (L) 3.5 - 5.0 g/dL   MAGNESIUM    Collection Time: 11/15/19  3:49 AM   Result Value Ref Range    Magnesium 2.2 1.6 - 2.4 mg/dL       Medications reviewed  Current Facility-Administered Medications   Medication Dose Route Frequency    butalbital-acetaminophen-caffeine (FIORICET, ESGIC) -40 mg per tablet 1 Tab  1 Tab Oral Q4H PRN    fluticasone propionate (FLONASE) 50 mcg/actuation nasal spray 2 Spray  2 Spray Both Nostrils DAILY    amoxicillin-clavulanate (AUGMENTIN) 875-125 mg per tablet 1 Tab  1 Tab Oral BID WITH MEALS    QUEtiapine (SEROquel) tablet 25 mg  25 mg Oral BID PRN    acetaminophen (TYLENOL) tablet 650 mg  650 mg Oral Q4H PRN    escitalopram oxalate (LEXAPRO) tablet 20 mg  20 mg Oral DAILY    predniSONE (DELTASONE) tablet 40 mg  40 mg Oral DAILY WITH BREAKFAST    albuterol-ipratropium (DUO-NEB) 2.5 MG-0.5 MG/3 ML  3 mL Nebulization Q4HWA RT    albuterol (PROVENTIL VENTOLIN) nebulizer solution 2.5 mg  2.5 mg Nebulization Q4H PRN    0.45% sodium chloride infusion  50 mL/hr IntraVENous CONTINUOUS    guaiFENesin ER (MUCINEX) tablet 1,200 mg  1,200 mg Oral Q12H    loratadine (CLARITIN) tablet 10 mg  10 mg Oral DAILY    enoxaparin (LOVENOX) injection 40 mg  40 mg SubCUTAneous Q24H     Care Plan discussed with: Patient and Nurse  Total time spent with patient: 30 minutes. Clarissa Singh MD

## 2019-11-15 NOTE — PROGRESS NOTES
[facility]  Pulmonary, Critical Care, and Sleep Medicine    Name: Frederick Oppenheim MRN: [id number]   : 1975 Hospital: [address] [facility]   Date: 11/15/2019        Progress Note    IMPRESSION:   · Acute hypoxemic/hypercapneic respiratory failure, extubated   · Ams, ?secondary to overdose  · Ileus  · Hypothermia  · Possible aspiration pneumonia  · Acute asthma exacerbation / drug induced bronchospasm  · Polysubstance abuse (tobacco, amphetamines, cocaine, heroin)      RECOMMENDATIONS:   · Supplemental O2 as needed to keep sats > 88%  · D/c IVF  · Continue to po Augmentin to complete 7 day course. · Follow cultures. · Continue scheduled duonebs, claritin, mucinex and flutter valve  · Continue 5 day prednisone burst  · Replete K  · Continue home lexapro; primary team has restarted Seroquel  · Lovenox    We will see as needed over the weekend and check back again on Monday if patient not discharged. Please call with questions. Subjective/History: This patient has been seen and evaluated at the request of Dr. Susan Martin for acute respiratory failure. Patient is a 53 y.o. male who presented to the ED overnight in resp failure. By report, pt was found poorly responsive by EMS. Became combative after receiving narcan; given ketamine and intubated. Pt with + UDS as above. Pt currently intubated and sedated on propofol. Spoke with pt's parents. Mother saw him yesterday and indicated he had no complaints then. Pt has a h/o depression but she did find him depressed or upset yesterday. They are unaware of any drug use.  + smoker; they believe rare etoh     - CT head: no acute process            - CT chest: L basilar asdz            - CT abd/pelvis: gastric and small bowel distension            - intubated, extubated    *all cultures NGTD     Tells me that he was diagnosed w/ asthma at age 1.   Has prn albuterol MDI/nebs at home which he uses 1x/week. +seasonal allergies (fall worst)  Denies post nasal drip or GERD  Smokes 1ppd    Interval history:  Afebrile  Sats 95% on 5L  WBC 12.2   K 3.2  TSH 0.32  Blood cx no growth x 3 days  11/13 ABG 7.45/31/49/21 on 4L    ROS: Feeling a little better this morning. SOB improved. Denies CP. Denies abd pain. Denies LE pain/swelling. Past Medical History:   Diagnosis Date    ADD (attention deficit disorder)     Asthma     Depression       Past Surgical History:   Procedure Laterality Date    HX ORTHOPAEDIC      Elbow    HX TONSILLECTOMY      1985      Prior to Admission medications    Medication Sig Start Date End Date Taking? Authorizing Provider   escitalopram oxalate (LEXAPRO) 20 mg tablet Take 20 mg by mouth daily. Yes Provider, Historical   diphenhydrAMINE (BENADRYL) 25 mg tablet Take 25 mg by mouth every six (6) hours as needed for Sleep (allergies). Yes Provider, Historical   albuterol (PROVENTIL HFA, VENTOLIN HFA, PROAIR HFA) 90 mcg/actuation inhaler Take 2 Puffs by inhalation every four (4) hours as needed for Wheezing or Shortness of Breath. 5/12/15  Yes Gopi Franks, DO   cetirizine (ZYRTEC) 10 mg tablet Take 10 mg by mouth daily as needed.    Yes Provider, Historical     Current Facility-Administered Medications   Medication Dose Route Frequency    fluticasone propionate (FLONASE) 50 mcg/actuation nasal spray 2 Spray  2 Spray Both Nostrils DAILY    amoxicillin-clavulanate (AUGMENTIN) 875-125 mg per tablet 1 Tab  1 Tab Oral BID WITH MEALS    escitalopram oxalate (LEXAPRO) tablet 20 mg  20 mg Oral DAILY    predniSONE (DELTASONE) tablet 40 mg  40 mg Oral DAILY WITH BREAKFAST    albuterol-ipratropium (DUO-NEB) 2.5 MG-0.5 MG/3 ML  3 mL Nebulization Q4HWA RT    0.45% sodium chloride infusion  50 mL/hr IntraVENous CONTINUOUS    guaiFENesin ER (MUCINEX) tablet 1,200 mg  1,200 mg Oral Q12H    loratadine (CLARITIN) tablet 10 mg  10 mg Oral DAILY    enoxaparin (LOVENOX) injection 40 mg  40 mg SubCUTAneous Q24H     No Known Allergies   Social History     Tobacco Use    Smoking status: Former Smoker     Types: Cigarettes     Last attempt to quit: 2013     Years since quittin.2    Smokeless tobacco: Never Used   Substance Use Topics    Alcohol use: No      Family History   Problem Relation Age of Onset    Arthritis-osteo Maternal Grandmother     Heart Disease Maternal Grandmother         CABG/Stents    Cancer Maternal Grandfather         Unknown          Objective:   Vital Signs:    Visit Vitals  /77 (BP 1 Location: Left arm, BP Patient Position: Sitting;Pre-activity)   Pulse 72   Temp 98 °F (36.7 °C)   Resp 18   Ht 5' 10\" (1.778 m)   Wt 72.9 kg (160 lb 11.2 oz)   SpO2 95%   BMI 23.06 kg/m²       O2 Device: Nasal cannula   O2 Flow Rate (L/min): 5 l/min   Temp (24hrs), Av.1 °F (36.7 °C), Min:98 °F (36.7 °C), Max:98.4 °F (36.9 °C)       Intake/Output:   Last shift:      11/15 07 - 11/15 190  In: -   Out: 600 [Urine:600]  Last 3 shifts:  190 - 11/15 0700  In: 118 [P.O.:118]  Out: 2670 [Urine:2670]    Intake/Output Summary (Last 24 hours) at 11/15/2019 1016  Last data filed at 11/15/2019 0751  Gross per 24 hour   Intake 118 ml   Output 2170 ml   Net -2052 ml       Physical Exam:    General:  Alert, awake   Head:  Normocephalic, without obvious abnormality, atraumatic. Eyes:  Conjunctivae/corneas clear. EOMs intact. Nose: Nares normal. Septum midline. Mucosa normal. No drainage or sinus tenderness. Throat: Moist mucous membranes   Neck: Supple, symmetrical, trachea midline, no adenopathy. Lungs:   Diffuse rhonchi and wheeze   Chest wall:  No tenderness or deformity. Heart:  Regular rate and rhythm, S1, S2 normal, no murmur, click, rub or gallop. Abdomen:   Soft, non-tender. Bowel sounds normal. No masses,  No organomegaly. Extremities: Extremities normal, atraumatic, no cyanosis or edema. Pulses: 2+ and symmetric all extremities.    Skin: Skin color, texture, turgor normal. No rashes or lesions   Lymph nodes: Cervical and clavicular nodes normal.   Neurologic: Oriented x 3       Data:     Recent Results (from the past 24 hour(s))   CBC WITH AUTOMATED DIFF    Collection Time: 11/15/19  3:49 AM   Result Value Ref Range    WBC 12.2 (H) 4.1 - 11.1 K/uL    RBC 3.96 (L) 4.10 - 5.70 M/uL    HGB 12.4 12.1 - 17.0 g/dL    HCT 36.9 36.6 - 50.3 %    MCV 93.2 80.0 - 99.0 FL    MCH 31.3 26.0 - 34.0 PG    MCHC 33.6 30.0 - 36.5 g/dL    RDW 13.4 11.5 - 14.5 %    PLATELET 209 471 - 329 K/uL    MPV 9.6 8.9 - 12.9 FL    NRBC 0.0 0  WBC    ABSOLUTE NRBC 0.00 0.00 - 0.01 K/uL    NEUTROPHILS 76 (H) 32 - 75 %    LYMPHOCYTES 18 12 - 49 %    MONOCYTES 6 5 - 13 %    EOSINOPHILS 0 0 - 7 %    BASOPHILS 0 0 - 1 %    IMMATURE GRANULOCYTES 0 0.0 - 0.5 %    ABS. NEUTROPHILS 9.1 (H) 1.8 - 8.0 K/UL    ABS. LYMPHOCYTES 2.2 0.8 - 3.5 K/UL    ABS. MONOCYTES 0.7 0.0 - 1.0 K/UL    ABS. EOSINOPHILS 0.0 0.0 - 0.4 K/UL    ABS. BASOPHILS 0.0 0.0 - 0.1 K/UL    ABS. IMM. GRANS. 0.1 (H) 0.00 - 0.04 K/UL    DF AUTOMATED     RENAL FUNCTION PANEL    Collection Time: 11/15/19  3:49 AM   Result Value Ref Range    Sodium 142 136 - 145 mmol/L    Potassium 3.2 (L) 3.5 - 5.1 mmol/L    Chloride 110 (H) 97 - 108 mmol/L    CO2 27 21 - 32 mmol/L    Anion gap 5 5 - 15 mmol/L    Glucose 97 65 - 100 mg/dL    BUN 15 6 - 20 MG/DL    Creatinine 0.96 0.70 - 1.30 MG/DL    BUN/Creatinine ratio 16 12 - 20      GFR est AA >60 >60 ml/min/1.73m2    GFR est non-AA >60 >60 ml/min/1.73m2    Calcium 8.1 (L) 8.5 - 10.1 MG/DL    Phosphorus 2.5 (L) 2.6 - 4.7 MG/DL    Albumin 2.7 (L) 3.5 - 5.0 g/dL   MAGNESIUM    Collection Time: 11/15/19  3:49 AM   Result Value Ref Range    Magnesium 2.2 1.6 - 2.4 mg/dL                 Imaging:  I have personally reviewed the patients radiographs and have reviewed the reports:  CXR 11/13:  Left basilar atelectasis/consolidation with small left effusion. Minimal parenchymal opacity in the right.   CXR 11/14: Continued left basilar atelectasis/consolidation with small left-sided effusion. Overall aeration modestly improved.        Barbara West

## 2019-11-15 NOTE — PROGRESS NOTES
Physical Therapy - Orders received and chart reviewed. Patient is a 52 yo male admitted for unresponsive due to drug overdose, sepsis and SBO/ileus. Was in ICU intubated on arrival on 11/12/19. Spoke with RN Manuela Mcpherson who just persuaded patient to participate in PT eval. However upon arrival to his room patient requesting PT return another time as he is complaining of severe splitting headache from not receiving his regular medications for ADHD. RN reports patient up ad fredy to bathroom, safe and independent. Patient is 52 yo who was independent w/o mobility issues PTA and who does not appear to be having mobility issues at this time. Completing PT orders at this time.   Debbie Colbert PT, DPT

## 2019-11-16 VITALS
RESPIRATION RATE: 18 BRPM | HEIGHT: 70 IN | HEART RATE: 64 BPM | OXYGEN SATURATION: 92 % | BODY MASS INDEX: 23.01 KG/M2 | TEMPERATURE: 98.9 F | DIASTOLIC BLOOD PRESSURE: 78 MMHG | WEIGHT: 160.7 LBS | SYSTOLIC BLOOD PRESSURE: 125 MMHG

## 2019-11-16 LAB
ALBUMIN SERPL-MCNC: 2.8 G/DL (ref 3.5–5)
ANION GAP SERPL CALC-SCNC: 7 MMOL/L (ref 5–15)
BASOPHILS # BLD: 0 K/UL (ref 0–0.1)
BASOPHILS NFR BLD: 0 % (ref 0–1)
BUN SERPL-MCNC: 13 MG/DL (ref 6–20)
BUN/CREAT SERPL: 13 (ref 12–20)
CALCIUM SERPL-MCNC: 8.5 MG/DL (ref 8.5–10.1)
CHLORIDE SERPL-SCNC: 107 MMOL/L (ref 97–108)
CO2 SERPL-SCNC: 26 MMOL/L (ref 21–32)
CREAT SERPL-MCNC: 0.98 MG/DL (ref 0.7–1.3)
DIFFERENTIAL METHOD BLD: ABNORMAL
EOSINOPHIL # BLD: 0 K/UL (ref 0–0.4)
EOSINOPHIL NFR BLD: 0 % (ref 0–7)
ERYTHROCYTE [DISTWIDTH] IN BLOOD BY AUTOMATED COUNT: 13.6 % (ref 11.5–14.5)
GLUCOSE SERPL-MCNC: 124 MG/DL (ref 65–100)
HCT VFR BLD AUTO: 35.9 % (ref 36.6–50.3)
HGB BLD-MCNC: 12.1 G/DL (ref 12.1–17)
HIV 1+2 AB+HIV1 P24 AG SERPL QL IA: NONREACTIVE
HIV12 RESULT COMMENT, HHIVC: NORMAL
IMM GRANULOCYTES # BLD AUTO: 0.1 K/UL (ref 0–0.04)
IMM GRANULOCYTES NFR BLD AUTO: 1 % (ref 0–0.5)
LYMPHOCYTES # BLD: 1.9 K/UL (ref 0.8–3.5)
LYMPHOCYTES NFR BLD: 14 % (ref 12–49)
MAGNESIUM SERPL-MCNC: 2 MG/DL (ref 1.6–2.4)
MCH RBC QN AUTO: 31.3 PG (ref 26–34)
MCHC RBC AUTO-ENTMCNC: 33.7 G/DL (ref 30–36.5)
MCV RBC AUTO: 93 FL (ref 80–99)
MONOCYTES # BLD: 0.9 K/UL (ref 0–1)
MONOCYTES NFR BLD: 6 % (ref 5–13)
NEUTS SEG # BLD: 10.5 K/UL (ref 1.8–8)
NEUTS SEG NFR BLD: 79 % (ref 32–75)
NRBC # BLD: 0 K/UL (ref 0–0.01)
NRBC BLD-RTO: 0 PER 100 WBC
PHOSPHATE SERPL-MCNC: 2 MG/DL (ref 2.6–4.7)
PLATELET # BLD AUTO: 248 K/UL (ref 150–400)
PMV BLD AUTO: 9.6 FL (ref 8.9–12.9)
POTASSIUM SERPL-SCNC: 3.1 MMOL/L (ref 3.5–5.1)
RBC # BLD AUTO: 3.86 M/UL (ref 4.1–5.7)
SODIUM SERPL-SCNC: 140 MMOL/L (ref 136–145)
WBC # BLD AUTO: 13.4 K/UL (ref 4.1–11.1)

## 2019-11-16 PROCEDURE — 74011250637 HC RX REV CODE- 250/637: Performed by: PHYSICIAN ASSISTANT

## 2019-11-16 PROCEDURE — 87591 N.GONORRHOEAE DNA AMP PROB: CPT

## 2019-11-16 PROCEDURE — 86592 SYPHILIS TEST NON-TREP QUAL: CPT

## 2019-11-16 PROCEDURE — 85025 COMPLETE CBC W/AUTO DIFF WBC: CPT

## 2019-11-16 PROCEDURE — 74011000250 HC RX REV CODE- 250: Performed by: INTERNAL MEDICINE

## 2019-11-16 PROCEDURE — 83735 ASSAY OF MAGNESIUM: CPT

## 2019-11-16 PROCEDURE — 74011250637 HC RX REV CODE- 250/637: Performed by: FAMILY MEDICINE

## 2019-11-16 PROCEDURE — 87389 HIV-1 AG W/HIV-1&-2 AB AG IA: CPT

## 2019-11-16 PROCEDURE — 80069 RENAL FUNCTION PANEL: CPT

## 2019-11-16 PROCEDURE — 94640 AIRWAY INHALATION TREATMENT: CPT

## 2019-11-16 PROCEDURE — 74011636637 HC RX REV CODE- 636/637: Performed by: INTERNAL MEDICINE

## 2019-11-16 PROCEDURE — 77010033678 HC OXYGEN DAILY

## 2019-11-16 PROCEDURE — 94760 N-INVAS EAR/PLS OXIMETRY 1: CPT

## 2019-11-16 PROCEDURE — 36415 COLL VENOUS BLD VENIPUNCTURE: CPT

## 2019-11-16 PROCEDURE — 74011250637 HC RX REV CODE- 250/637: Performed by: INTERNAL MEDICINE

## 2019-11-16 RX ORDER — FLUTICASONE PROPIONATE 50 MCG
SPRAY, SUSPENSION (ML) NASAL
Qty: 1 BOTTLE | Refills: 1 | Status: SHIPPED | OUTPATIENT
Start: 2019-11-16

## 2019-11-16 RX ORDER — POTASSIUM CHLORIDE 750 MG/1
40 TABLET, FILM COATED, EXTENDED RELEASE ORAL
Status: COMPLETED | OUTPATIENT
Start: 2019-11-16 | End: 2019-11-16

## 2019-11-16 RX ORDER — PREDNISONE 20 MG/1
40 TABLET ORAL
Qty: 4 TAB | Refills: 0 | Status: SHIPPED | OUTPATIENT
Start: 2019-11-17 | End: 2019-11-19

## 2019-11-16 RX ORDER — QUETIAPINE FUMARATE 25 MG/1
25 TABLET, FILM COATED ORAL
Qty: 30 TAB | Refills: 0 | Status: SHIPPED | OUTPATIENT
Start: 2019-11-16

## 2019-11-16 RX ORDER — AMOXICILLIN AND CLAVULANATE POTASSIUM 875; 125 MG/1; MG/1
1 TABLET, FILM COATED ORAL 2 TIMES DAILY WITH MEALS
Qty: 6 TAB | Refills: 0 | Status: SHIPPED | OUTPATIENT
Start: 2019-11-16 | End: 2019-11-19

## 2019-11-16 RX ADMIN — ESCITALOPRAM OXALATE 20 MG: 10 TABLET ORAL at 08:50

## 2019-11-16 RX ADMIN — GUAIFENESIN 1200 MG: 600 TABLET, EXTENDED RELEASE ORAL at 08:50

## 2019-11-16 RX ADMIN — PREDNISONE 40 MG: 20 TABLET ORAL at 08:50

## 2019-11-16 RX ADMIN — BUTALBITAL, ACETAMINOPHEN, AND CAFFEINE 1 TABLET: 50; 325; 40 TABLET ORAL at 08:58

## 2019-11-16 RX ADMIN — AMOXICILLIN AND CLAVULANATE POTASSIUM 1 TABLET: 875; 125 TABLET, FILM COATED ORAL at 08:50

## 2019-11-16 RX ADMIN — QUETIAPINE FUMARATE 25 MG: 25 TABLET ORAL at 08:58

## 2019-11-16 RX ADMIN — ESCITALOPRAM 20 MG: 10 TABLET, FILM COATED ORAL at 08:50

## 2019-11-16 RX ADMIN — IPRATROPIUM BROMIDE AND ALBUTEROL SULFATE 3 ML: .5; 3 SOLUTION RESPIRATORY (INHALATION) at 11:00

## 2019-11-16 RX ADMIN — FLUTICASONE PROPIONATE 2 SPRAY: 50 SPRAY, METERED NASAL at 09:52

## 2019-11-16 RX ADMIN — DOCUSATE SODIUM 100 MG: 100 CAPSULE, LIQUID FILLED ORAL at 08:51

## 2019-11-16 RX ADMIN — POTASSIUM CHLORIDE 40 MEQ: 750 TABLET, FILM COATED, EXTENDED RELEASE ORAL at 09:51

## 2019-11-16 RX ADMIN — LORATADINE 10 MG: 10 TABLET ORAL at 08:51

## 2019-11-16 NOTE — PROGRESS NOTES
RN in another pt's room when pt walked by heading off unit. Pt stated that he was going to cafeteria while walking off. Several minutes later, L&D called up to floor and stated that pt had been wandering around the floor asking about where the cafeteria was. Also stated that pt was complaining about cleanliness of his room and threatening to post videos online. Charge RN notified, stated that a security alert should be called to locate pt. Security alert for missing adult called. Security up to floor; however, pt also arrived back at floor.

## 2019-11-16 NOTE — ROUTINE PROCESS
Patient stated \" I am going to the cafeteria to get food\"  I wanted the nursing supervisor and it has been over 1 hour so I am going. Wanted then to know where I was going. To the Cafe I am hungry. Nurse Sven Hamman. Informed by patient.

## 2019-11-16 NOTE — DISCHARGE SUMMARY
Physician Discharge Summary     Patient ID:    Meche Rosa  [id number]  76 y.o.  1975  None    Admit date: 11/12/2019    Discharge date and time: 11/16/2019    Admission Diagnoses: Sepsis ([facility] [A41.9]  Small bowel obstruction ([facility] [Z62.888]    Chronic Diagnoses:    Problem List as of 11/16/2019 Date Reviewed: 9/3/2014          Codes Class Noted - Resolved    Small bowel obstruction ([facility] ICD-10-CM: E39.928  ICD-9-CM: 560.9  11/12/2019 - Present        Sepsis ([facility] ICD-10-CM: A41.9  ICD-9-CM: 038.9, 995.91  11/12/2019 - Present              Discharge Medications:   Current Discharge Medication List      START taking these medications    Details   amoxicillin-clavulanate (AUGMENTIN) 875-125 mg per tablet Take 1 Tab by mouth two (2) times daily (with meals) for 3 days. Qty: 6 Tab, Refills: 0      fluticasone propionate (FLONASE) 50 mcg/actuation nasal spray 2 sprays in each nostril daily  Qty: 1 Bottle, Refills: 1      guaiFENesin ER (MUCINEX) 1,200 mg Ta12 ER tablet Take 1 Tab by mouth every twelve (12) hours. Qty: 60 Tab, Refills: 0      predniSONE (DELTASONE) 20 mg tablet Take 40 mg by mouth daily (with breakfast) for 2 days. Qty: 4 Tab, Refills: 0      QUEtiapine (SEROQUEL) 25 mg tablet Take 1 Tab by mouth daily (after breakfast). Qty: 30 Tab, Refills: 0         CONTINUE these medications which have NOT CHANGED    Details   escitalopram oxalate (LEXAPRO) 20 mg tablet Take 20 mg by mouth daily. diphenhydrAMINE (BENADRYL) 25 mg tablet Take 25 mg by mouth every six (6) hours as needed for Sleep (allergies). albuterol (PROVENTIL HFA, VENTOLIN HFA, PROAIR HFA) 90 mcg/actuation inhaler Take 2 Puffs by inhalation every four (4) hours as needed for Wheezing or Shortness of Breath. Qty: 1 Inhaler, Refills: 0      cetirizine (ZYRTEC) 10 mg tablet Take 10 mg by mouth daily as needed. Follow up Care:    1.  Srikanth Fenton in 1-2 weeks    Diet:  Regular Diet    Disposition:  Home. Advanced Directive:    Discharge Exam:  [See today's progress note.]    CONSULTATIONS: Pulmonary/Intensive care, Psychiatry and General Surgery    Significant Diagnostic Studies:   Recent Labs     11/16/19  0021 11/15/19  0349   WBC 13.4* 12.2*   HGB 12.1 12.4   HCT 35.9* 36.9    237     Recent Labs     11/16/19  0021 11/15/19  0349 11/14/19  0339    142 140   K 3.1* 3.2* 3.6    110* 107   CO2 26 27 27   BUN 13 15 10   CREA 0.98 0.96 0.83   * 97 109*   CA 8.5 8.1* 8.0*   MG 2.0 2.2 2.4   PHOS 2.0* 2.5* 2.2*     Recent Labs     11/16/19  0021 11/15/19  0349 11/14/19  0339   ALB 2.8* 2.7* 2.8*     No results for input(s): INR, PTP, APTT, INREXT in the last 72 hours. No results for input(s): FE, TIBC, PSAT, FERR in the last 72 hours. Recent Labs     11/13/19  1338   PH 7.45   PCO2 31*   PO2 49*     No results for input(s): CPK, CKMB in the last 72 hours. No lab exists for component: TROPONINI  No components found for: [place]  Lab Results   Component Value Date/Time    TSH 0.32 (L) 11/13/2019 05:44 AM             HOSPITAL COURSE & TREATMENT RENDERED:   1. Patient admitted after being found unresponsive at home. UDS + THC, cocaine, amphetamines. He was intubated and on vent support for a short period of time. Treated with supportive measures and also for possible aspiration PNA with abx and steroids. Seen in consult by pulmonary and psychiatry. Psychiatry advised OP f/u for generalized anxiety disorder - seroquel helped with this while inpatient so it was continued. Seen by surgery for possible SBO, this resolved without intervention. He was discharged home in stable and improved condition to complete a course of abx and steroids and f/u with psychiatry OP.       Signed:  Chalo Jurado MD  11/16/2019  9:31 AM

## 2019-11-16 NOTE — PROGRESS NOTES
Chart reviewed. Per nursing and PT note patient has been up at fredy walking and talking himself to bathroom sfaely and independently. Does not appear to have acute Ot needs. Will complete order.

## 2019-11-16 NOTE — PROGRESS NOTES
Bedside and Verbal shift change report given to [address]  (oncoming nurse) by Lauryn Burnette RN  (offgoing nurse). Report included the following information SBAR, Kardex, Intake/Output and Recent Results.

## 2019-11-16 NOTE — PROGRESS NOTES
I have reviewed discharge instructions with the patient. The patient verbalized understanding. Discharge medications reviewed with patient and appropriate educational materials and side effects teaching were provided. Signed copy of AVS in pt chart.   Domingo Rodriges

## 2019-11-16 NOTE — DISCHARGE INSTRUCTIONS
Patient Discharge Instructions    Mak Oneill / [id number] : 1975    Admitted 2019 Discharged: 2019 9:30 AM     ACUTE DIAGNOSES:  Sepsis ([facility] [A41.9]  Small bowel obstruction ([facility] [H13.932]    CHRONIC MEDICAL DIAGNOSES:  Problem List as of 2019 Date Reviewed: 9/3/2014          Codes Class Noted - Resolved    Small bowel obstruction ([facility] ICD-10-CM: S84.409  ICD-9-CM: 560.9  2019 - Present        Sepsis ([facility] ICD-10-CM: A41.9  ICD-9-CM: 038.9, 995.91  2019 - Present              DISCHARGE MEDICATIONS:       · It is important that you take the medication exactly as they are prescribed. · Keep your medication in the bottles provided by the pharmacist and keep a list of the medication names, dosages, and times to be taken in your wallet. · Do not take other medications without consulting your doctor. DIET:  Regular Diet    ACTIVITY: Activity as tolerated and no driving for today    ADDITIONAL INFORMATION: If you experience any of the following symptoms then please call your primary care physician or return to the emergency room if you cannot get hold of your doctor: Fever, chills, nausea, vomiting, diarrhea, change in mentation, falling, bleeding, shortness of breath. FOLLOW UP CARE:  Primary Care doctor you are to call and set up an appointment to see them in 2 weeks. Information obtained by :  I understand that if any problems occur once I am at home I am to contact my physician. I understand and acknowledge receipt of the instructions indicated above.                                                                                                                                            Physician's or R.N.'s Signature                                                                  Date/Time                                                                                                                                              Patient or Representative Signature                                                          Date/Time

## 2019-11-16 NOTE — ROUTINE PROCESS
Patient left nursing unit to go to cafeteria, stating that he was hungry and he didn't care he was going to find him some food. His nurse Senthil He. Notified as well by patient.

## 2019-11-16 NOTE — PROGRESS NOTES
Daily Progress & DIscharge Note: 11/16/2019  Earl Garza  None    Assessment/Plan:   1. Sepsis       - on Unasym initially - switched to Augmentin       - blood cultures negative     2. AMS - ? Due to drugs - see UDS       -  resolved     3. Small bowel obstruction - resolved     4. Acute respiratory failure with acute uncompensated primary respiratory/ metabolic acidosis       -resolved -- tolerating RA  --prednisone burst     5. Severe metabolic acidosis- resolved     6.  Leukocytosis       -  monitor as OP. Could be due to steroid use     7. Hypothermia- resolved         8. New onset type 2 diabetes mellitus uncontrolled - with nonketotic hyperosmolar diabetic syndrome       -  A1c ok       -  Monitor every 6 hours    9. Lactic acidosis       -resolved     10. Cocaine / Meth/ THC/ polysubstance abuse         - would encourage drug cessation         - psy consulted and has little to add    11. Asthma exacerbation        -  Solumedrol IV initially and then to po Pred        -  Duoneb treatments per Pul - has albuterol inhaler at home to use prn    12   Depression        - On Lexapro        - Psych consulted and has little to add   -- will add seroquel 25mg daily with OP f/u    13. Headache       - Fioricet        - Flonase    14. Hypokalemia       - replete prior to discharge. Recheck as OP     VTE prophylaxis      - SCDs to BLEs     Problem List:  Problem List as of 11/16/2019 Date Reviewed: 9/3/2014          Codes Class Noted - Resolved    Small bowel obstruction ([facility] ICD-10-CM: V62.185  ICD-9-CM: 560.9  11/12/2019 - Present        Sepsis ([facility] ICD-10-CM: A41.9  ICD-9-CM: 038.9, 995.91  11/12/2019 - Present              Subjective:     53 y.o. male with past medical history of ADD, Asthma, depression presented to the ED via EMS with reported AMS (altered mental status). Patient is already ETT intubated and was placed on mechanical ventilator support on arrival to the ED this morning.   As such, history was obtained per my review of ED and electronic medical records. Later some additional history was obtained from patient's next of kin- his mother. EMS was reportedly called by patient's girlfriend as she heard patient moaning. Patient reportedly was found on the floor unresponsive. Patient was noted to have coffee ground emesis in his mouth. Patient was reportedly given Ketamine per EMS. He was initially on a 100% non-re breather and then was endotracheally intubated on admission to the ED. Patient is now seen for admission for further evaluation and treatments. Per discussion with patient's mother, she saw the patient yesterday at which time he had no complaints. (Dr Carson Purvis)    11/12: Intubated and sedated. Discussed with pts parents and they report he did not seem depressed yesterday. Urine Drug screen with cocaine, meth and THC. 11/13:  Cultures neg so far. Tmax 98. He is awake and alert. Now on 6L O2 via NC. No c/o abd pain. Tolerating clears. 11/14: BC pending. Still wheezing and rhonchorus. On 4-6 L oxygen via NC. He is c/o increasing depression and social isolation. Has a poor social support system. His mom is helping him financially. He is worried that he may not have a job any longer. He reports after a break up 5 years ago his life went down and he lost his career and things are not getting better. He was seeing a woman and that is who we was with when he tried the cocaine. He reports this was a one time incident. Has been on Adderral for ADHD. Has tried to go to counseling the past but has not been able to start. Is agreeable to seeing psych while here. He is c/o frontal headache and sinus congestion. 11/15:  Feeling better he reports but still requiring O2 to keep sats up. Still wheezing quite a bit. On Prednisone. Potassium a little low - replacing - may be due to nebs. Cultures remain neg. Psy has seen and has nothing to add. Sinus congestion better. : He is feeling well. Wants to go home. Has been walking the halls without O2 and sats fine. No CP or SOB. +cough. He c/o anxiety and feels that seroquel helps. Review of Systems:   A comprehensive review of systems was negative except for that written in the HPI. Objective:   Physical Exam:   Visit Vitals  /81 (BP 1 Location: Right arm)   Pulse 65   Temp 98.9 °F (37.2 °C)   Resp 18   Ht 5' 10\" (1.778 m)   Wt 72.9 kg (160 lb 11.2 oz)   SpO2 94%   BMI 23.06 kg/m²    O2 Flow Rate (L/min): 4 l/min O2 Device: Room air  Temp (24hrs), Av.2 °F (36.8 °C), Min:97.8 °F (36.6 °C), Max:98.9 °F (37.2 °C)    No intake/output data recorded.  1901 -  0700  In: 118 [P.O.:118]  Out: 2020 [Urine:2020]  General:  Alert and oriented, appears stated age. Head:  Normocephalic, without obvious abnormality, atraumatic. Eyes:  Conjunctivae/corneas clear. EOMs appear intact. Neck: Supple, symmetrical, trachea midline, no adenopathy   Lungs:   Wheezing RLL Otherwise clear   Heart:  Regular rate and rhythm, S1, S2 normal, no murmur, click, rub or gallop. Abdomen:   Soft, non-tender, mildly distended. Bowel sounds normal. No masses,  No organomegaly. Extremities: no cyanosis or edema. No calf tenderness or cords. Pulses: 2+ and symmetric all extremities. Skin: turgor normal. No rashes    Neurologic: Alert and Oriented. Answers questions appropriately, moving all extremities      Data Review:    19  EXAM: CT CHEST ABD PELV W CONT  Clinical history: Vomiting, coffee-ground emesis  INDICATION: distended abdomen, vomiting coffee ground  COMPARISON: None  FINDINGS:   THYROID: No nodule. MEDIASTINUM: No mass or lymphadenopathy. NEIL: No mass or lymphadenopathy. THORACIC AORTA: No dissection or aneurysm. MAIN PULMONARY ARTERY: Normal in caliber. TRACHEA/BRONCHI: ET tube in place. ESOPHAGUS: NG tube tip in the gastric body. HEART: Normal in size.   PLEURA: Left basilar atelectasis/consolidation. LUNGS: ET tube in place. Small nodule in the right lung measures 5 mm in size. LIVER: No mass or biliary dilatation. GALLBLADDER: Unremarkable. SPLEEN: No mass. PANCREAS: No mass or ductal dilatation. ADRENALS: Unremarkable. KIDNEYS: No mass, calculus, or hydronephrosis. STOMACH: There is gastric distention. SMALL BOWEL: There is extensive small bowel distention as well. Transition in  the pelvis. COLON: Fecal stasis. APPENDIX: Unremarkable. PERITONEUM: No ascites or pneumoperitoneum. RETROPERITONEUM: No lymphadenopathy or aortic aneurysm. REPRODUCTIVE ORGANS:  URINARY BLADDER: Partida catheter in the bladder. BONES: No destructive bone lesion. ADDITIONAL COMMENTS: N/A  IMPRESSION:  Imaging findings are consistent with small bowel obstruction. Gastric and small bowel distention with transition point in the pelvis. Left basilar atelectasis/consolidation.   ET tube and NG tube in place    Recent Days:  Recent Labs     11/16/19  0021 11/15/19  0349 11/14/19  0339   WBC 13.4* 12.2* 11.1   HGB 12.1 12.4 11.7*   HCT 35.9* 36.9 35.3*    237 206     Recent Labs     11/16/19  0021 11/15/19  0349 11/14/19  0339    142 140   K 3.1* 3.2* 3.6    110* 107   CO2 26 27 27   * 97 109*   BUN 13 15 10   CREA 0.98 0.96 0.83   CA 8.5 8.1* 8.0*   MG 2.0 2.2 2.4   PHOS 2.0* 2.5* 2.2*   ALB 2.8* 2.7* 2.8*     Recent Labs     11/13/19  1338   PH 7.45   PCO2 31*   PO2 49*   HCO3 21*     24 Hour Results:  Recent Results (from the past 24 hour(s))   CBC WITH AUTOMATED DIFF    Collection Time: 11/16/19 12:21 AM   Result Value Ref Range    WBC 13.4 (H) 4.1 - 11.1 K/uL    RBC 3.86 (L) 4.10 - 5.70 M/uL    HGB 12.1 12.1 - 17.0 g/dL    HCT 35.9 (L) 36.6 - 50.3 %    MCV 93.0 80.0 - 99.0 FL    MCH 31.3 26.0 - 34.0 PG    MCHC 33.7 30.0 - 36.5 g/dL    RDW 13.6 11.5 - 14.5 %    PLATELET 652 539 - 848 K/uL    MPV 9.6 8.9 - 12.9 FL    NRBC 0.0 0  WBC    ABSOLUTE NRBC 0.00 0.00 - 0.01 K/uL    NEUTROPHILS 79 (H) 32 - 75 %    LYMPHOCYTES 14 12 - 49 %    MONOCYTES 6 5 - 13 %    EOSINOPHILS 0 0 - 7 %    BASOPHILS 0 0 - 1 %    IMMATURE GRANULOCYTES 1 (H) 0.0 - 0.5 %    ABS. NEUTROPHILS 10.5 (H) 1.8 - 8.0 K/UL    ABS. LYMPHOCYTES 1.9 0.8 - 3.5 K/UL    ABS. MONOCYTES 0.9 0.0 - 1.0 K/UL    ABS. EOSINOPHILS 0.0 0.0 - 0.4 K/UL    ABS. BASOPHILS 0.0 0.0 - 0.1 K/UL    ABS. IMM.  GRANS. 0.1 (H) 0.00 - 0.04 K/UL    DF AUTOMATED     RENAL FUNCTION PANEL    Collection Time: 11/16/19 12:21 AM   Result Value Ref Range    Sodium 140 136 - 145 mmol/L    Potassium 3.1 (L) 3.5 - 5.1 mmol/L    Chloride 107 97 - 108 mmol/L    CO2 26 21 - 32 mmol/L    Anion gap 7 5 - 15 mmol/L    Glucose 124 (H) 65 - 100 mg/dL    BUN 13 6 - 20 MG/DL    Creatinine 0.98 0.70 - 1.30 MG/DL    BUN/Creatinine ratio 13 12 - 20      GFR est AA >60 >60 ml/min/1.73m2    GFR est non-AA >60 >60 ml/min/1.73m2    Calcium 8.5 8.5 - 10.1 MG/DL    Phosphorus 2.0 (L) 2.6 - 4.7 MG/DL    Albumin 2.8 (L) 3.5 - 5.0 g/dL   MAGNESIUM    Collection Time: 11/16/19 12:21 AM   Result Value Ref Range    Magnesium 2.0 1.6 - 2.4 mg/dL       Medications reviewed  Current Facility-Administered Medications   Medication Dose Route Frequency    docusate sodium (COLACE) capsule 100 mg  100 mg Oral BID    escitalopram oxalate (LEXAPRO) tablet 20 mg  20 mg Oral DAILY    butalbital-acetaminophen-caffeine (FIORICET, ESGIC) -40 mg per tablet 1 Tab  1 Tab Oral Q4H PRN    fluticasone propionate (FLONASE) 50 mcg/actuation nasal spray 2 Spray  2 Spray Both Nostrils DAILY    amoxicillin-clavulanate (AUGMENTIN) 875-125 mg per tablet 1 Tab  1 Tab Oral BID WITH MEALS    QUEtiapine (SEROquel) tablet 25 mg  25 mg Oral BID PRN    acetaminophen (TYLENOL) tablet 650 mg  650 mg Oral Q4H PRN    escitalopram oxalate (LEXAPRO) tablet 20 mg  20 mg Oral DAILY    predniSONE (DELTASONE) tablet 40 mg  40 mg Oral DAILY WITH BREAKFAST    albuterol-ipratropium (DUO-NEB) 2.5 MG-0.5 MG/3 ML  3 mL Nebulization Q4HWA RT    albuterol (PROVENTIL VENTOLIN) nebulizer solution 2.5 mg  2.5 mg Nebulization Q4H PRN    guaiFENesin ER (MUCINEX) tablet 1,200 mg  1,200 mg Oral Q12H    loratadine (CLARITIN) tablet 10 mg  10 mg Oral DAILY    enoxaparin (LOVENOX) injection 40 mg  40 mg SubCUTAneous Q24H

## 2019-11-17 LAB
BACTERIA SPEC CULT: NORMAL
SERVICE CMNT-IMP: NORMAL

## 2019-11-18 LAB
C TRACH DNA SPEC QL NAA+PROBE: NEGATIVE
N GONORRHOEA DNA SPEC QL NAA+PROBE: NEGATIVE
RPR SER QL: NONREACTIVE
SAMPLE TYPE: NORMAL
SERVICE CMNT-IMP: NORMAL
SPECIMEN SOURCE: NORMAL

## 2020-01-17 ENCOUNTER — HOSPITAL ENCOUNTER (EMERGENCY)
Age: 45
Discharge: HOME | End: 2020-01-17
Attending: EMERGENCY MEDICINE
Payer: COMMERCIAL

## 2020-01-17 VITALS
BODY MASS INDEX: 22.19 KG/M2 | HEART RATE: 80 BPM | OXYGEN SATURATION: 96 % | RESPIRATION RATE: 18 BRPM | HEIGHT: 70 IN | TEMPERATURE: 97.9 F | DIASTOLIC BLOOD PRESSURE: 64 MMHG | WEIGHT: 155 LBS | SYSTOLIC BLOOD PRESSURE: 95 MMHG

## 2020-01-17 DIAGNOSIS — F19.10 POLYSUBSTANCE ABUSE (HCC): ICD-10-CM

## 2020-01-17 DIAGNOSIS — T40.1X2A INTENTIONAL HEROIN OVERDOSE, INITIAL ENCOUNTER (HCC): Primary | ICD-10-CM

## 2020-01-17 LAB
ALBUMIN SERPL-MCNC: 3.8 G/DL (ref 3.5–5)
ALBUMIN/GLOB SERPL: 1.1 {RATIO} (ref 1.1–2.2)
ALP SERPL-CCNC: 68 U/L (ref 45–117)
ALT SERPL-CCNC: 32 U/L (ref 12–78)
AMPHET UR QL SCN: POSITIVE
ANION GAP SERPL CALC-SCNC: 5 MMOL/L (ref 5–15)
APPEARANCE UR: ABNORMAL
AST SERPL-CCNC: 15 U/L (ref 15–37)
ATRIAL RATE: 84 BPM
BACTERIA URNS QL MICRO: NEGATIVE /HPF
BARBITURATES UR QL SCN: NEGATIVE
BASOPHILS # BLD: 0 K/UL (ref 0–0.1)
BASOPHILS NFR BLD: 0 % (ref 0–1)
BENZODIAZ UR QL: NEGATIVE
BILIRUB SERPL-MCNC: 0.3 MG/DL (ref 0.2–1)
BILIRUB UR QL: NEGATIVE
BUN SERPL-MCNC: 11 MG/DL (ref 6–20)
BUN/CREAT SERPL: 10 (ref 12–20)
CALCIUM SERPL-MCNC: 8.3 MG/DL (ref 8.5–10.1)
CALCULATED P AXIS, ECG09: 70 DEGREES
CALCULATED R AXIS, ECG10: 61 DEGREES
CALCULATED T AXIS, ECG11: 67 DEGREES
CANNABINOIDS UR QL SCN: POSITIVE
CHLORIDE SERPL-SCNC: 108 MMOL/L (ref 97–108)
CO2 SERPL-SCNC: 26 MMOL/L (ref 21–32)
COCAINE UR QL SCN: POSITIVE
COLOR UR: ABNORMAL
CREAT SERPL-MCNC: 1.09 MG/DL (ref 0.7–1.3)
DIAGNOSIS, 93000: NORMAL
DIFFERENTIAL METHOD BLD: ABNORMAL
DRUG SCRN COMMENT,DRGCM: ABNORMAL
EOSINOPHIL # BLD: 0 K/UL (ref 0–0.4)
EOSINOPHIL NFR BLD: 0 % (ref 0–7)
EPITH CASTS URNS QL MICRO: ABNORMAL /LPF
ERYTHROCYTE [DISTWIDTH] IN BLOOD BY AUTOMATED COUNT: 13 % (ref 11.5–14.5)
ETHANOL SERPL-MCNC: <10 MG/DL
GLOBULIN SER CALC-MCNC: 3.4 G/DL (ref 2–4)
GLUCOSE SERPL-MCNC: 169 MG/DL (ref 65–100)
GLUCOSE UR STRIP.AUTO-MCNC: 250 MG/DL
HCT VFR BLD AUTO: 43.6 % (ref 36.6–50.3)
HGB BLD-MCNC: 14 G/DL (ref 12.1–17)
HGB UR QL STRIP: NEGATIVE
HYALINE CASTS URNS QL MICRO: ABNORMAL /LPF (ref 0–5)
IMM GRANULOCYTES # BLD AUTO: 0.1 K/UL (ref 0–0.04)
IMM GRANULOCYTES NFR BLD AUTO: 1 % (ref 0–0.5)
KETONES UR QL STRIP.AUTO: NEGATIVE MG/DL
LEUKOCYTE ESTERASE UR QL STRIP.AUTO: NEGATIVE
LYMPHOCYTES # BLD: 1.1 K/UL (ref 0.8–3.5)
LYMPHOCYTES NFR BLD: 6 % (ref 12–49)
MCH RBC QN AUTO: 31 PG (ref 26–34)
MCHC RBC AUTO-ENTMCNC: 32.1 G/DL (ref 30–36.5)
MCV RBC AUTO: 96.5 FL (ref 80–99)
METHADONE UR QL: NEGATIVE
MONOCYTES # BLD: 0.5 K/UL (ref 0–1)
MONOCYTES NFR BLD: 3 % (ref 5–13)
NEUTS SEG # BLD: 15.1 K/UL (ref 1.8–8)
NEUTS SEG NFR BLD: 90 % (ref 32–75)
NITRITE UR QL STRIP.AUTO: NEGATIVE
NRBC # BLD: 0 K/UL (ref 0–0.01)
NRBC BLD-RTO: 0 PER 100 WBC
OPIATES UR QL: NEGATIVE
P-R INTERVAL, ECG05: 166 MS
PCP UR QL: NEGATIVE
PH UR STRIP: 5.5 [PH] (ref 5–8)
PLATELET # BLD AUTO: 308 K/UL (ref 150–400)
PMV BLD AUTO: 9.4 FL (ref 8.9–12.9)
POTASSIUM SERPL-SCNC: 3.8 MMOL/L (ref 3.5–5.1)
PROT SERPL-MCNC: 7.2 G/DL (ref 6.4–8.2)
PROT UR STRIP-MCNC: NEGATIVE MG/DL
Q-T INTERVAL, ECG07: 374 MS
QRS DURATION, ECG06: 92 MS
QTC CALCULATION (BEZET), ECG08: 441 MS
RBC # BLD AUTO: 4.52 M/UL (ref 4.1–5.7)
RBC #/AREA URNS HPF: ABNORMAL /HPF (ref 0–5)
SODIUM SERPL-SCNC: 139 MMOL/L (ref 136–145)
SP GR UR REFRACTOMETRY: 1.01 (ref 1–1.03)
UA: UC IF INDICATED,UAUC: ABNORMAL
UROBILINOGEN UR QL STRIP.AUTO: 0.2 EU/DL (ref 0.2–1)
VENTRICULAR RATE, ECG03: 84 BPM
WBC # BLD AUTO: 16.8 K/UL (ref 4.1–11.1)
WBC URNS QL MICRO: ABNORMAL /HPF (ref 0–4)

## 2020-01-17 PROCEDURE — 93005 ELECTROCARDIOGRAM TRACING: CPT

## 2020-01-17 PROCEDURE — 96361 HYDRATE IV INFUSION ADD-ON: CPT

## 2020-01-17 PROCEDURE — 80307 DRUG TEST PRSMV CHEM ANLYZR: CPT

## 2020-01-17 PROCEDURE — 74011250636 HC RX REV CODE- 250/636: Performed by: EMERGENCY MEDICINE

## 2020-01-17 PROCEDURE — 80053 COMPREHEN METABOLIC PANEL: CPT

## 2020-01-17 PROCEDURE — 36415 COLL VENOUS BLD VENIPUNCTURE: CPT

## 2020-01-17 PROCEDURE — 96360 HYDRATION IV INFUSION INIT: CPT

## 2020-01-17 PROCEDURE — 85025 COMPLETE CBC W/AUTO DIFF WBC: CPT

## 2020-01-17 PROCEDURE — 99285 EMERGENCY DEPT VISIT HI MDM: CPT

## 2020-01-17 PROCEDURE — 81001 URINALYSIS AUTO W/SCOPE: CPT

## 2020-01-17 RX ORDER — POLYETHYLENE GLYCOL 3350 17 G/17G
17 POWDER, FOR SOLUTION ORAL 2 TIMES DAILY
Qty: 120 G | Refills: 1 | Status: SHIPPED | OUTPATIENT
Start: 2020-01-17

## 2020-01-17 RX ORDER — NALOXONE HYDROCHLORIDE 4 MG/.1ML
SPRAY NASAL
Qty: 2 EACH | Refills: 2 | Status: SHIPPED | OUTPATIENT
Start: 2020-01-17

## 2020-01-17 RX ADMIN — SODIUM CHLORIDE 1000 ML: 900 INJECTION, SOLUTION INTRAVENOUS at 04:26

## 2020-01-17 NOTE — ED PROVIDER NOTES
The patient is a 40-year-old male with a past medical history significant for asthma, depression, ADHD, status post tonsillectomy and elbow surgery who presents to the ED by EMS who states that the patient was doing heroin with his friends when he became unresponsive with agonal respiration and they were notified. Upon arrival, the patient was given intranasal Narcan with good relief of symptoms and discomfort. The patient appears to be back to his at his baseline. Patient has intermittent periods of apnea but barely arousable and speaking full sentences without any discomfort. He denies any suicidal or homicidal ideation, hallucinations, suicidal ideation, homicidal ideation, headache, chest pain, shortness of breath, nausea, vomiting, abdominal pain, dizziness, extremity weakness or numbness.            Past Medical History:   Diagnosis Date    ADD (attention deficit disorder)     Asthma     Depression        Past Surgical History:   Procedure Laterality Date    HX ORTHOPAEDIC      Elbow    HX TONSILLECTOMY               Family History:   Problem Relation Age of Onset    Arthritis-osteo Maternal Grandmother     Heart Disease Maternal Grandmother         CABG/Stents    Cancer Maternal Grandfather         Unknown       Social History     Socioeconomic History    Marital status: SINGLE     Spouse name: Not on file    Number of children: 0    Years of education: 13    Highest education level: Not on file   Occupational History    Occupation: sale     Comment: [employer] Financial resource strain: Not on file    Food insecurity:     Worry: Not on file     Inability: Not on file   MT DIGITAL MEDIA needs:     Medical: Not on file     Non-medical: Not on file   Tobacco Use    Smoking status: Former Smoker     Types: Cigarettes     Last attempt to quit: 2013     Years since quittin.4    Smokeless tobacco: Never Used   Substance and Sexual Activity    Alcohol use: No    Drug use: No    Sexual activity: Not Currently     Partners: Female   Lifestyle    Physical activity:     Days per week: Not on file     Minutes per session: Not on file    Stress: Not on file   Relationships    Social connections:     Talks on phone: Not on file     Gets together: Not on file     Attends Latter-day service: Not on file     Active member of club or organization: Not on file     Attends meetings of clubs or organizations: Not on file     Relationship status: Not on file    Intimate partner violence:     Fear of current or ex partner: Not on file     Emotionally abused: Not on file     Physically abused: Not on file     Forced sexual activity: Not on file   Other Topics Concern    Not on file   Social History Narrative    Not on file         ALLERGIES: Patient has no known allergies. Review of Systems   All other systems reviewed and are negative. Vitals:    01/17/20 0320 01/17/20 0345   BP: 109/73 104/69   Pulse: 78 77   Resp: 20 16   Temp: 96.6 °F (35.9 °C) 96.6 °F (35.9 °C)   SpO2: 97% 100%   Weight: 70.3 kg (155 lb)    Height: 5' 10\" (1.778 m)             Physical Exam  Vitals signs and nursing note reviewed. CONSTITUTIONAL: Well-appearing; well-nourished; in no apparent distress  HEAD: Normocephalic; atraumatic  EYES: PERRL; EOM intact; conjunctiva and sclera are clear bilaterally. ENT: No rhinorrhea; normal pharynx with no tonsillar hypertrophy; mucous membranes pink/moist, no erythema, no exudate. NECK: Supple; non-tender; no cervical lymphadenopathy  CARD: Normal S1, S2; no murmurs, rubs, or gallops. Regular rate and rhythm. RESP: Normal respiratory effort; breath sounds clear and equal bilaterally; no wheezes, rhonchi, or rales. ABD: Normal bowel sounds; non-distended; non-tender; no palpable organomegaly, no masses, no bruits. Back Exam: Normal inspection; no vertebral point tenderness, no CVA tenderness. Normal range of motion.   EXT: Normal ROM in all four extremities; non-tender to palpation; no swelling or deformity; distal pulses are normal, no edema. SKIN: Warm; dry; no rash. NEURO:Alert and oriented x 3, coherent, ARIELLA-XII grossly intact, sensory and motor are non-focal.        MDM  Number of Diagnoses or Management Options  Intentional heroin overdose, initial encounter [facility]):   Left against medical advice:   Diagnosis management comments: Assessment: Acute heroin overdose/  intentional secondary to recreational use. The patient is awake and oriented x3, follow commands appropriately as stated before has a brief period of apnea that is responsive to commands. He appears hemodynamically stable. Plan: Education, reassurance, symptomatic treatment/lab/EKG/serial exam/ Monitor and Reevaluate. Amount and/or Complexity of Data Reviewed  Clinical lab tests: ordered and reviewed  Tests in the radiology section of CPT®: ordered and reviewed  Tests in the medicine section of CPT®: reviewed and ordered  Discussion of test results with the performing providers: yes  Decide to obtain previous medical records or to obtain history from someone other than the patient: yes  Obtain history from someone other than the patient: yes  Review and summarize past medical records: yes  Discuss the patient with other providers: yes  Independent visualization of images, tracings, or specimens: yes    Risk of Complications, Morbidity, and/or Mortality  Presenting problems: moderate  Diagnostic procedures: moderate  Management options: moderate           Procedures    ED EKG interpretation:  Rhythm: normal sinus rhythm; and regular . Rate (approx.): 84; Axis: normal; P wave: normal; QRS interval: normal ; ST/T wave: normal; in  Lead: Diffusely; Other findings: borderline ekg. This EKG was interpreted by Jovon Crocker MD,ED Provider. Progress Note:   Pt has been reexamined by Jovon Crocker MD. Pt is feeling much better. Symptoms have improved.  All available results have been reviewed with pt and any available family. Pt understands sx, dx, and tx in ED. Care plan has been outlined and questions have been answered. Pt is ready to go home. Will send home on opioid overdose instruction. Outpatient referral with PCP as needed. Written by Cesar Norris MD,4:16 AM    .   .

## 2020-01-17 NOTE — DISCHARGE INSTRUCTIONS
Substance Use Disorder: Care Instructions  Overview    You can improve your life and health by stopping your use of alcohol or drugs. When you don't drink or use drugs, you may feel and sleep better. You may get along better with your family, friends, and coworkers. There are medicines and programs that can help with substance use disorder. How can you care for yourself at home? · If you have been given medicine to help keep you sober or reduce your cravings, be sure to take it as prescribed. · Talk to your doctor about programs that can help you stop using drugs or drinking alcohol. · If your doctor prescribes disulfiram (Antabuse), do not drink any alcohol while you are taking this medicine. You may have severe, even life-threatening, side effects from even small amounts of alcohol. · Do not tempt yourself by keeping alcohol or drugs in your home. · Learn how to say no when other people drink or use drugs. Or don't spend time with people who drink or use drugs. · Use the time and money spent on drinking or drugs to do something fun with your family or friends. Preventing a relapse  · Do not drink alcohol or use drugs at all. Using any amount of alcohol or drugs greatly increases your risk for relapse. · Seek help from organizations such as Alcoholics Anonymous, Narcotics Anonymous, or treatment facilities if you feel the need to drink alcohol or use drugs again. · Remember that recovery is a lifelong process. · Stay away from situations, friends, or places that may lead you to drink or use drugs. · Have a plan to spot and deal with relapse. Learn to recognize changes in your thinking that lead you to drink or use drugs. These are warning signs. Get help before you start to drink or use drugs again. · Get help as soon as you can if you relapse. Some people make a plan with another person that outlines what they want that person to do for them if they relapse.  The plan usually includes how to handle the relapse and who to notify in case of relapse. · Don't give up. Remember that a relapse does not mean that you have failed. Use the experience to learn the triggers that lead you to drink or use drugs. Then quit again. Many people have several relapses before they are able to quit for good. Follow-up care is a key part of your treatment and safety. Be sure to make and go to all appointments, and call your doctor if you are having problems. It's also a good idea to know your test results and keep a list of the medicines you take. When should you call for help? Call  911 anytime you think you may need emergency care. For example, call if:    · You feel you cannot stop from hurting yourself or someone else.   [facility] your doctor now or seek immediate medical care if:    · You have serious withdrawal symptoms, such as confusion, hallucinations, severe trembling, or seizures.    Watch closely for changes in your health, and be sure to contact your doctor if:    · You have a relapse.     · You need more help or support to stop. Where can you learn more? Go to http://[employer] Monitorsimi.info/. Enter [id number] in the search box to learn more about \"Substance Use Disorder: Care Instructions. \"  Current as of: February 5, 2019  Content Version: 12.2  © 2631-1976 [employer]. Care instructions adapted under license by [employer] (which disclaims liability or warranty for this information). If you have questions about a medical condition or this instruction, always ask your healthcare professional. [place] [address] any warranty or liability for your use of this information. Patient Education        Opioid Overdose: Care Instructions  Your Care Instructions    You have had treatment to help your body recover from taking too much of an opioid. You are getting better, but you may not feel well for a while. It takes time for the opioids to leave your body.  How long it takes to feel better depends on which drug you took and how much you took of it. Opioids include illegal drugs such as heroin, often called smack, junk, H, and ska. Opioids also include medicines that doctors prescribe to treat pain. These are medicines such as oxycodone, methadone, and buprenorphine. They are sometimes sold and used illegally. Taking too much of an opioid can be dangerous. It may cause:  · Trouble breathing. · Low blood pressure. · A low heart rate. · A coma. When the doctor treated you for the overdose, he or she may have:  · Watched your symptoms or done tests to find out what kind of drug you took. · Given you fluids. · Given you oxygen to help you breathe. · Given you a medicine called naloxone to help reverse the effects of the opioid. · Done several tests, including blood tests, to see how you're responding to treatment. The doctor also watched you carefully to make sure you were recovering safely. Follow-up care is a key part of your treatment and safety. Be sure to make and go to all appointments, and call your doctor if you are having problems. It's also a good idea to know your test results and keep a list of the medicines you take. How can you care for yourself at home? · If you take opioids regularly, your body gets used to them. This is called physical dependence. If you are physically dependent on opioids, you may have withdrawal symptoms when you stop using them or use less. These symptoms can include nausea, sweating, chills, diarrhea, stomach cramps, and muscle aches. Withdrawal can last up to several weeks, depending on which opioid you took and how long you took it. You may feel very ill, but you probably aren't in medical danger. · Your doctor may give you medicine to help you feel better. To help you get through withdrawal, you can also:  ? Get plenty of rest.  ? Drink plenty of fluids. ? Stay active. ? Eat a healthy diet.   · If you had a tube in your throat to help you breathe, you may have a sore throat or hoarseness that can last a few days. Sip liquids to help soothe your throat. · Do not drink alcohol or take illegal drugs. · Do not take medicines that make you tired, like sleeping pills or muscle relaxers. · Do not drive if you feel sleepy or groggy while you recover from an overdose. · Get help to stop using opioids. Talk to your doctor about substance use treatment programs. · Talk to your doctor or pharmacist about having a naloxone rescue kit on hand. When should you call for help? Call 911 anytime you think you may need emergency care. For example, call if:    · You feel you cannot stop from hurting yourself or someone else.   [facility] your doctor now or seek immediate medical care if:    · You have new or worse withdrawal symptoms, such as:  ? Stomach cramps. ? Vomiting. ? Diarrhea. ? Muscle aches. ? Sweating.    Watch closely for changes in your health, and be sure to contact your doctor if:    · You do not get better as expected. Where can you learn more? Go to http://salvador-simi.info/. Enter [phone] in the search box to learn more about \"Opioid Overdose: Care Instructions. \"  Current as of: February 5, 2019  Content Version: 12.2  © 2705-6803 [employer]. Care instructions adapted under license by [employer] (which disclaims liability or warranty for this information). If you have questions about a medical condition or this instruction, always ask your healthcare professional. [place] [address] any warranty or liability for your use of this information.

## 2020-01-17 NOTE — ED TRIAGE NOTES
Found unresponsive at home with agonal breathing. EMS called. Patient was given 4mg of narcan while waiting on EMS. PD administered an additional 4mg of narcan once they arrived on scene. Claims he only used heroin, not cocaine. Per EMS, second time he has overdosed.

## 2020-01-17 NOTE — ED NOTES
Patient refusing blood work at this time. Patient connected to the heart monitor, pulse ox, end tidal, and bp set to run q15 minutes. Mother came to bedside to evaluate patient and indicated to the patient that the police were checking his house. 3:40 AM Patient removed all of the monitor equipment and got dressed. Patient is refusing treatment at this time. Dr. Pastor Beatty explained to patient that he needs to be evaluated. Patient again refusing treatment. Patient allowed me to place the monitor equipment back on him. 3:46 AM Per request of Dr. Pastor Beatty a call was made to  [facility] to inform dispatch that patient is refusing treatment and wanting to leave. Dispatch updated the officers on scene. Patient has been provided a cup of water per request x2      4:22 AM  Patient agreeable at this time for labs, ekg. Patient provided a warm blanket for comfort.

## 2020-06-20 NOTE — ED NOTES
Patient Throughput:  Charge nurse on ICU made aware of patient's room assignment, room 11    Current MEWS score of 5          Tomy Owens RN  Shift Resource Nurse  Emergency Department Patient is a 56y old  Male who presents with a chief complaint of shortness of breath (19 Jun 2020 16:14)      SUBJECTIVE / OVERNIGHT EVENTS:  Patient resting in bed - calmer today - asking for his depokote- Rn about to give medication    MEDICATIONS  (STANDING):  albuterol/ipratropium for Nebulization 3 milliLiter(s) Nebulizer every 6 hours  ammonium lactate 12% Lotion 1 Application(s) Topical two times a day  chlorhexidine 4% Liquid 1 Application(s) Topical <User Schedule>  dextrose 5%. 1000 milliLiter(s) (50 mL/Hr) IV Continuous <Continuous>  dextrose 50% Injectable 12.5 Gram(s) IV Push once  dextrose 50% Injectable 25 Gram(s) IV Push once  dextrose 50% Injectable 25 Gram(s) IV Push once  enoxaparin Injectable 40 milliGRAM(s) SubCutaneous daily  insulin lispro (HumaLOG) corrective regimen sliding scale   SubCutaneous every 6 hours  methylPREDNISolone sodium succinate Injectable 40 milliGRAM(s) IV Push daily  valproate sodium IVPB 750 milliGRAM(s) IV Intermittent every 12 hours    MEDICATIONS  (PRN):  dextrose 40% Gel 15 Gram(s) Oral once PRN Blood Glucose LESS THAN 70 milliGRAM(s)/deciliter  glucagon  Injectable 1 milliGRAM(s) IntraMuscular once PRN Glucose LESS THAN 70 milligrams/deciliter  LORazepam   Injectable 1 milliGRAM(s) IntraMuscular once PRN severe agitation        CAPILLARY BLOOD GLUCOSE      POCT Blood Glucose.: 116 mg/dL (20 Jun 2020 11:46)  POCT Blood Glucose.: 190 mg/dL (19 Jun 2020 20:47)  POCT Blood Glucose.: 148 mg/dL (19 Jun 2020 16:05)    I&O's Summary    19 Jun 2020 07:01  -  20 Jun 2020 07:00  --------------------------------------------------------  IN: 0 mL / OUT: 550 mL / NET: -550 mL      Vital Signs Last 24 Hrs  T(C): 36.4 (20 Jun 2020 09:00), Max: 36.7 (19 Jun 2020 20:59)  T(F): 97.6 (20 Jun 2020 09:00), Max: 98 (19 Jun 2020 20:59)  HR: 99 (20 Jun 2020 09:00) (65 - 99)  BP: 139/85 (20 Jun 2020 09:00) (99/58 - 139/85)  BP(mean): --  RR: 18 (20 Jun 2020 09:00) (18 - 18)  SpO2: 98% (20 Jun 2020 09:00) (92% - 98%)  PHYSICAL EXAM:  GENERAL: NAD, well-developed  HEAD:  Atraumatic, Normocephalic  EYES: EOMI, PERRLA, conjunctiva and sclera clear  NECK: Supple, No JVD  CHEST/LUNG: Clear to auscultation bilaterally; No wheeze  HEART: Regular rate and rhythm; No murmurs, rubs, or gallops  ABDOMEN: Soft, Nontender, Nondistended; Bowel sounds present  EXTREMITIES:  2+ Peripheral Pulses, No clubbing, cyanosis, or edema  PSYCH: Alert  NEUROLOGY: non-focal  SKIN: Toe nail thickening and erythematous skin changes to toes and feet with excoriation of skin

## 2021-02-05 ENCOUNTER — HOSPITAL ENCOUNTER (EMERGENCY)
Age: 46
Discharge: HOME | End: 2021-02-05
Attending: EMERGENCY MEDICINE

## 2021-02-05 VITALS
TEMPERATURE: 97.8 F | DIASTOLIC BLOOD PRESSURE: 66 MMHG | HEIGHT: 70 IN | OXYGEN SATURATION: 97 % | BODY MASS INDEX: 22.19 KG/M2 | HEART RATE: 82 BPM | WEIGHT: 155 LBS | RESPIRATION RATE: 18 BRPM | SYSTOLIC BLOOD PRESSURE: 107 MMHG

## 2021-02-05 DIAGNOSIS — Z20.822 SUSPECTED COVID-19 VIRUS INFECTION: Primary | ICD-10-CM

## 2021-02-05 LAB — SARS-COV-2, COV2: NORMAL

## 2021-02-05 PROCEDURE — U0003 INFECTIOUS AGENT DETECTION BY NUCLEIC ACID (DNA OR RNA); SEVERE ACUTE RESPIRATORY SYNDROME CORONAVIRUS 2 (SARS-COV-2) (CORONAVIRUS DISEASE [COVID-19]), AMPLIFIED PROBE TECHNIQUE, MAKING USE OF HIGH THROUGHPUT TECHNOLOGIES AS DESCRIBED BY CMS-2020-01-R: HCPCS

## 2021-02-05 PROCEDURE — 99282 EMERGENCY DEPT VISIT SF MDM: CPT

## 2021-02-05 RX ORDER — ZINC SULFATE 50(220)MG
220 CAPSULE ORAL DAILY
Qty: 14 CAP | Refills: 0 | Status: SHIPPED | OUTPATIENT
Start: 2021-02-05 | End: 2021-02-19

## 2021-02-05 RX ORDER — ASCORBIC ACID 500 MG
500 TABLET ORAL 2 TIMES DAILY
Qty: 28 TAB | Refills: 0 | Status: SHIPPED | OUTPATIENT
Start: 2021-02-05 | End: 2021-02-19

## 2021-02-05 RX ORDER — DOXYCYCLINE 100 MG/1
100 CAPSULE ORAL 2 TIMES DAILY
Qty: 14 CAP | Refills: 0 | Status: SHIPPED | OUTPATIENT
Start: 2021-02-05 | End: 2021-02-12

## 2021-02-05 NOTE — ED TRIAGE NOTES
Pt lost taste/smell 4 days ago with chest tightness, SOB, chills, and body aches starting this morning. Pt anxious in triage.

## 2021-02-05 NOTE — ED PROVIDER NOTES
44yo male with past medical history significant for asthma, depression, ADD presents ambulatory for evaluation of concern for Covid. He states his symptoms began 4 days ago with loss of sense of taste, loss of sense of smell, intermittent chest tightness, intermittent shortness of breath, with myalgias and chills began this morning. Tmax 99 last night. No vomiting, diarrhea. He spoke with a close contact who has concerns that they have COVID, they have similar sx's as patient and are getting tested today as well. He notes cough is productive with no hemoptysis or leg swelling. He denies DE DIOS.             Past Medical History:   Diagnosis Date    ADD (attention deficit disorder)     Asthma     Depression        Past Surgical History:   Procedure Laterality Date    HX ORTHOPAEDIC      Elbow    HX TONSILLECTOMY               Family History:   Problem Relation Age of Onset    Arthritis-osteo Maternal Grandmother     Heart Disease Maternal Grandmother         CABG/Stents    Cancer Maternal Grandfather         Unknown       Social History     Socioeconomic History    Marital status: SINGLE     Spouse name: Not on file    Number of children: 0    Years of education: 13    Highest education level: Not on file   Occupational History    Occupation: sale     Comment: [address] [employer] Financial resource strain: Not on file    Food insecurity     Worry: Not on file     Inability: Not on file   [employer] needs     Medical: Not on file     Non-medical: Not on file   Tobacco Use    Smoking status: Former Smoker     Types: Cigarettes     Quit date: 2013     Years since quittin.5    Smokeless tobacco: Never Used   Substance and Sexual Activity    Alcohol use: No    Drug use: No    Sexual activity: Not Currently     Partners: Female   Lifestyle    Physical activity     Days per week: Not on file     Minutes per session: Not on file    Stress: Not on file   Relationships    Social connections     Talks on phone: Not on file     Gets together: Not on file     Attends Mandaeism service: Not on file     Active member of club or organization: Not on file     Attends meetings of clubs or organizations: Not on file     Relationship status: Not on file    Intimate partner violence     Fear of current or ex partner: Not on file     Emotionally abused: Not on file     Physically abused: Not on file     Forced sexual activity: Not on file   Other Topics Concern    Not on file   Social History Narrative    Not on file         ALLERGIES: Patient has no known allergies. Review of Systems   Constitutional: Positive for appetite change, chills and fatigue. Negative for activity change and unexpected weight change. HENT: Positive for congestion and rhinorrhea. Negative for trouble swallowing. Respiratory: Positive for chest tightness (intermittent). Negative for cough, shortness of breath and wheezing. Cardiovascular: Negative. Negative for chest pain and palpitations. Gastrointestinal: Negative. Negative for abdominal pain, diarrhea, nausea and vomiting. Genitourinary: Negative. Negative for dysuria, flank pain, frequency and hematuria. Musculoskeletal: Positive for myalgias. Negative for arthralgias, back pain, neck pain and neck stiffness. Skin: Negative. Negative for color change and rash. Neurological: Negative. Negative for dizziness, numbness and headaches. All other systems reviewed and are negative. Vitals:    02/05/21 1653   BP: 107/66   Pulse: 82   Resp: 18   Temp: 97.8 °F (36.6 °C)   SpO2: 96%   Weight: 70.3 kg (155 lb)   Height: 5' 10\" (1.778 m)            Physical Exam  Vitals signs and nursing note reviewed. Constitutional:       General: He is not in acute distress. Appearance: He is well-developed. He is not toxic-appearing or diaphoretic. Comments: WM   HENT:      Head: Normocephalic and atraumatic.    Eyes:      General:         Right eye: No discharge. Left eye: No discharge. Conjunctiva/sclera: Conjunctivae normal.      Pupils: Pupils are equal, round, and reactive to light. Neck:      Musculoskeletal: Full passive range of motion without pain and normal range of motion. Trachea: No tracheal tenderness. Cardiovascular:      Rate and Rhythm: Normal rate and regular rhythm. Pulses: Normal pulses. Heart sounds: Normal heart sounds. Pulmonary:      Effort: Pulmonary effort is normal. No respiratory distress. Breath sounds: Normal breath sounds. Abdominal:      General: There is no distension. Musculoskeletal: Normal range of motion. General: No tenderness. Skin:     General: Skin is warm and dry. Capillary Refill: Capillary refill takes less than 2 seconds. Findings: No abrasion, erythema or rash. Neurological:      Mental Status: He is alert and oriented to person, place, and time. Cranial Nerves: No cranial nerve deficit. Sensory: No sensory deficit. Coordination: Coordination normal.   Psychiatric:         Speech: Speech normal.         Behavior: Behavior normal.          MDM  Number of Diagnoses or Management Options  Suspected COVID-19 virus infection  Diagnosis management comments:   Ddx: COVID, PNA / COVID PNA       Amount and/or Complexity of Data Reviewed  Review and summarize past medical records: yes    Patient Progress  Patient progress: stable         Procedures    I discussed patient's PMH, exam findings as well as careplan with the ER attending who agrees with care plan. Yadira Shaw PA-C     Patient declines offer for CXR due to financial reasons. He states he would like to be treated as presumed COVID PNA so will prescribe doxycycline (azithromycin prolongs QT with his antidepressant medication.)   He ambulated with pulse ox which was 97%, 97% at rest as well. He is in no respiratory distress at rest or when ambulating. Return precautions discussed. Discussed home isolation for 10 days and until feeling better. He already had mychart set up and discussed to use this to review his results and will call in 2-3 days if positive. All questions answered. Yadira Shaw PA-C      Marjorie Singh was evaluated in the Emergency Department on (Not on file) for the symptoms described in the history of present illness. He/she was evaluated in the context of the global COVID-19 pandemic, which necessitated consideration that the patient might be at risk for infection with the SARS-CoV-2 virus that causes COVID-19. Institutional protocols and algorithms that pertain to the evaluation of patients at risk for COVID-19 are in a state of rapid change based on information released by regulatory bodies including the CDC and federal and state organizations. These policies and algorithms were followed during the patient's care in the ED. Surrogate Decision Maker (Who do you want to make decisions for you in the event you are not able to?): Extended Emergency Contact Information  Primary Emergency Contact: none,none  Relation: None    Ventilation (Do you want to be intubated and mechanically ventilated?):  Yes    CPR (Do you want chest compressions and electricity in an attempt to restart your heart?): Yes        DISCHARGE NOTE:  5:10 PM  The patient's results have been reviewed with them and/or available family. Patient and/or family verbally conveyed their understanding and agreement of the patient's signs, symptoms, diagnosis, treatment and prognosis and additionally agree to follow up as recommended in the discharge instructions or to return to the Emergency Room should their condition change prior to their follow-up appointment. The patient/family verbally agrees with the care-plan and verbally conveys that all of their questions have been answered.  The discharge instructions have also been provided to the patient and/or family with some educational information regarding the patient's diagnosis as well a list of reasons why the patient would want to return to the ER prior to their follow-up appointment, should their condition change. Plan:  1. F/U with pcp  2. Rx doxy, zinc, vit c  3.  Return precautions discussed and advised to return to ER if worse

## 2021-02-06 LAB
COVID-19, XGCOVT: NOT DETECTED
HEALTH STATUS, XMCV2T: NORMAL
SPECIMEN TYPE, XMCV1T: NORMAL

## 2021-02-08 ENCOUNTER — PATIENT OUTREACH (OUTPATIENT)
Dept: CASE MANAGEMENT | Age: 46
End: 2021-02-08

## 2021-02-08 NOTE — PROGRESS NOTES
ACM attempted to follow up with patient after recent ED visit to provide education regarding COVID-19 symptoms and precautions. Attempts to reach patient were unsuccessful. A [facility] patient portal message has been sent to patient with the following information:  hotline number 600-700-9071, [facility] hotline number 123-457-9143, contact PCP for questions or concerns regarding COVID-19, CDC guidelines provided along with symptoms to watch out for and return to ED if having difficulty breathing. ACM supplied contact information. Episode of care will be resolved.    Octavia Connell RN  Ambulatory Care Manager

## 2021-06-13 ENCOUNTER — HOSPITAL ENCOUNTER (EMERGENCY)
Age: 46
Discharge: HOME | End: 2021-06-13
Attending: EMERGENCY MEDICINE
Payer: MEDICAID

## 2021-06-13 ENCOUNTER — APPOINTMENT (OUTPATIENT)
Dept: GENERAL RADIOLOGY | Age: 46
End: 2021-06-13
Attending: EMERGENCY MEDICINE
Payer: MEDICAID

## 2021-06-13 ENCOUNTER — APPOINTMENT (OUTPATIENT)
Dept: CT IMAGING | Age: 46
End: 2021-06-13
Attending: EMERGENCY MEDICINE
Payer: MEDICAID

## 2021-06-13 VITALS
SYSTOLIC BLOOD PRESSURE: 117 MMHG | RESPIRATION RATE: 17 BRPM | TEMPERATURE: 98.7 F | HEART RATE: 88 BPM | DIASTOLIC BLOOD PRESSURE: 70 MMHG | OXYGEN SATURATION: 93 %

## 2021-06-13 DIAGNOSIS — T40.604A OPIATE OVERDOSE, UNDETERMINED INTENT, INITIAL ENCOUNTER (HCC): Primary | ICD-10-CM

## 2021-06-13 DIAGNOSIS — R06.2 WHEEZING: ICD-10-CM

## 2021-06-13 LAB
ALBUMIN SERPL-MCNC: 3.9 G/DL (ref 3.5–5)
ALBUMIN/GLOB SERPL: 1.2 {RATIO} (ref 1.1–2.2)
ALP SERPL-CCNC: 63 U/L (ref 45–117)
ALT SERPL-CCNC: 19 U/L (ref 12–78)
ANION GAP SERPL CALC-SCNC: 9 MMOL/L (ref 5–15)
APAP SERPL-MCNC: <2 UG/ML (ref 10–30)
AST SERPL-CCNC: 15 U/L (ref 15–37)
BASOPHILS # BLD: 0 K/UL (ref 0–0.1)
BASOPHILS NFR BLD: 0 % (ref 0–1)
BILIRUB SERPL-MCNC: 0.7 MG/DL (ref 0.2–1)
BUN SERPL-MCNC: 15 MG/DL (ref 6–20)
BUN/CREAT SERPL: 12 (ref 12–20)
CALCIUM SERPL-MCNC: 8.4 MG/DL (ref 8.5–10.1)
CHLORIDE SERPL-SCNC: 104 MMOL/L (ref 97–108)
CO2 SERPL-SCNC: 24 MMOL/L (ref 21–32)
CREAT SERPL-MCNC: 1.22 MG/DL (ref 0.7–1.3)
DIFFERENTIAL METHOD BLD: ABNORMAL
EOSINOPHIL # BLD: 0.1 K/UL (ref 0–0.4)
EOSINOPHIL NFR BLD: 2 % (ref 0–7)
ERYTHROCYTE [DISTWIDTH] IN BLOOD BY AUTOMATED COUNT: 13.6 % (ref 11.5–14.5)
ETHANOL SERPL-MCNC: <10 MG/DL
GLOBULIN SER CALC-MCNC: 3.3 G/DL (ref 2–4)
GLUCOSE SERPL-MCNC: 133 MG/DL (ref 65–100)
HCT VFR BLD AUTO: 38 % (ref 36.6–50.3)
HGB BLD-MCNC: 12.5 G/DL (ref 12.1–17)
IMM GRANULOCYTES # BLD AUTO: 0.1 K/UL (ref 0–0.04)
IMM GRANULOCYTES NFR BLD AUTO: 1 % (ref 0–0.5)
LYMPHOCYTES # BLD: 1.5 K/UL (ref 0.8–3.5)
LYMPHOCYTES NFR BLD: 19 % (ref 12–49)
MCH RBC QN AUTO: 32.4 PG (ref 26–34)
MCHC RBC AUTO-ENTMCNC: 32.9 G/DL (ref 30–36.5)
MCV RBC AUTO: 98.4 FL (ref 80–99)
MONOCYTES # BLD: 0.6 K/UL (ref 0–1)
MONOCYTES NFR BLD: 8 % (ref 5–13)
NEUTS SEG # BLD: 5.5 K/UL (ref 1.8–8)
NEUTS SEG NFR BLD: 70 % (ref 32–75)
NRBC # BLD: 0 K/UL (ref 0–0.01)
NRBC BLD-RTO: 0 PER 100 WBC
PLATELET # BLD AUTO: 310 K/UL (ref 150–400)
PMV BLD AUTO: 9.6 FL (ref 8.9–12.9)
POTASSIUM SERPL-SCNC: 3.9 MMOL/L (ref 3.5–5.1)
PROT SERPL-MCNC: 7.2 G/DL (ref 6.4–8.2)
RBC # BLD AUTO: 3.86 M/UL (ref 4.1–5.7)
SALICYLATES SERPL-MCNC: <1.7 MG/DL (ref 2.8–20)
SODIUM SERPL-SCNC: 137 MMOL/L (ref 136–145)
WBC # BLD AUTO: 7.9 K/UL (ref 4.1–11.1)

## 2021-06-13 PROCEDURE — 80179 DRUG ASSAY SALICYLATE: CPT

## 2021-06-13 PROCEDURE — 74011250636 HC RX REV CODE- 250/636: Performed by: EMERGENCY MEDICINE

## 2021-06-13 PROCEDURE — 99285 EMERGENCY DEPT VISIT HI MDM: CPT

## 2021-06-13 PROCEDURE — 80143 DRUG ASSAY ACETAMINOPHEN: CPT

## 2021-06-13 PROCEDURE — 82077 ASSAY SPEC XCP UR&BREATH IA: CPT

## 2021-06-13 PROCEDURE — 80053 COMPREHEN METABOLIC PANEL: CPT

## 2021-06-13 PROCEDURE — 74011250636 HC RX REV CODE- 250/636

## 2021-06-13 PROCEDURE — 94640 AIRWAY INHALATION TREATMENT: CPT

## 2021-06-13 PROCEDURE — 85025 COMPLETE CBC W/AUTO DIFF WBC: CPT

## 2021-06-13 PROCEDURE — 96361 HYDRATE IV INFUSION ADD-ON: CPT

## 2021-06-13 PROCEDURE — 71045 X-RAY EXAM CHEST 1 VIEW: CPT

## 2021-06-13 PROCEDURE — 96360 HYDRATION IV INFUSION INIT: CPT

## 2021-06-13 PROCEDURE — 36415 COLL VENOUS BLD VENIPUNCTURE: CPT

## 2021-06-13 PROCEDURE — 74011000250 HC RX REV CODE- 250: Performed by: EMERGENCY MEDICINE

## 2021-06-13 RX ORDER — PREDNISONE 20 MG/1
60 TABLET ORAL DAILY
Qty: 15 TABLET | Refills: 0 | Status: SHIPPED | OUTPATIENT
Start: 2021-06-13 | End: 2021-06-18

## 2021-06-13 RX ORDER — NALOXONE HYDROCHLORIDE 4 MG/.1ML
SPRAY NASAL
Qty: 1 EACH | Refills: 0 | Status: SHIPPED | OUTPATIENT
Start: 2021-06-13

## 2021-06-13 RX ORDER — NALOXONE HYDROCHLORIDE 0.4 MG/ML
1 INJECTION, SOLUTION INTRAMUSCULAR; INTRAVENOUS; SUBCUTANEOUS ONCE
Status: DISCONTINUED | OUTPATIENT
Start: 2021-06-13 | End: 2021-06-13 | Stop reason: HOSPADM

## 2021-06-13 RX ORDER — NALOXONE HYDROCHLORIDE 1 MG/ML
INJECTION INTRAMUSCULAR; INTRAVENOUS; SUBCUTANEOUS
Status: COMPLETED
Start: 2021-06-13 | End: 2021-06-13

## 2021-06-13 RX ADMIN — ALBUTEROL SULFATE 1 DOSE: 2.5 SOLUTION RESPIRATORY (INHALATION) at 15:28

## 2021-06-13 RX ADMIN — SODIUM CHLORIDE 1000 ML: 9 INJECTION, SOLUTION INTRAVENOUS at 14:53

## 2021-06-13 RX ADMIN — NALOXONE HYDROCHLORIDE 1 MG: 1 INJECTION PARENTERAL at 14:28

## 2021-06-13 RX ADMIN — ALBUTEROL SULFATE 1 DOSE: 2.5 SOLUTION RESPIRATORY (INHALATION) at 14:44

## 2021-06-13 RX ADMIN — ALBUTEROL SULFATE: 2.5 SOLUTION RESPIRATORY (INHALATION) at 15:09

## 2021-06-13 NOTE — ED TRIAGE NOTES
Pt arrived via EMS, on EMS arrival patient was found lying next to his truck unresponsive. Pt was given a total of 1 mg of narcan and normal saline. Pt became arousable and is now A/O x4. Pt has hx of methamphetamine abuse but states he last used in March. Pt's pupils are pinpoint and pt has an abrasion to L side of forehead and face.

## 2021-06-13 NOTE — ED PROVIDER NOTES
The history is provided by the patient. No  was used. Syncope   This is a new problem. The current episode started less than 1 hour ago. The problem occurs rarely. The problem has been resolved. He lost consciousness for a period of 1 to 5 minutes. Pertinent negatives include no chest pain, no palpitations, no confusion, no fever, no abdominal pain, no nausea, no headaches, no back pain, no dizziness, no light-headedness, no seizures and no weakness. The treatment provided no relief. His past medical history does not include no syncope.         Past Medical History:   Diagnosis Date    ADD (attention deficit disorder)     Asthma     Depression        Past Surgical History:   Procedure Laterality Date    HX ORTHOPAEDIC      Elbow    HX TONSILLECTOMY      1985         Family History:   Problem Relation Age of Onset    Arthritis-osteo Maternal Grandmother     Heart Disease Maternal Grandmother         CABG/Stents    Cancer Maternal Grandfather         Unknown       Social History     Socioeconomic History    Marital status: SINGLE     Spouse name: Not on file    Number of children: 0    Years of education: 13    Highest education level: Not on file   Occupational History    Occupation: sale     Comment: [address]   Tobacco Use    Smoking status: Former Smoker     Types: Cigarettes     Quit date: 2013     Years since quittin.8    Smokeless tobacco: Never Used   Substance and Sexual Activity    Alcohol use: No    Drug use: Yes     Types: Methamphetamines, Heroin    Sexual activity: Not Currently     Partners: Female   Other Topics Concern    Not on file   Social History Narrative    Not on file     Social Determinants of Health     Financial Resource Strain:     Difficulty of Paying Living Expenses:    Food Insecurity:     Worried About Running Out of Food in the Last Year:     Pawan of Food in the Last Year:    Transportation Needs:     Lack of Transportation (Medical):  Lack of Transportation (Non-Medical):    Physical Activity:     Days of Exercise per Week:     Minutes of Exercise per Session:    Stress:     Feeling of Stress :    Social Connections:     Frequency of Communication with Friends and Family:     Frequency of Social Gatherings with Friends and Family:     Attends Moravian Services:     Active Member of Clubs or Organizations:     Attends Club or Organization Meetings:     Marital Status:    Intimate Partner Violence:     Fear of Current or Ex-Partner:     Emotionally Abused:     Physically Abused:     Sexually Abused: ALLERGIES: Patient has no known allergies. Review of Systems   Constitutional: Negative for activity change, chills and fever. HENT: Negative for nosebleeds, sore throat, trouble swallowing and voice change. Eyes: Negative for visual disturbance. Respiratory: Negative for shortness of breath. Cardiovascular: Positive for syncope. Negative for chest pain and palpitations. Gastrointestinal: Negative for abdominal pain, constipation, diarrhea and nausea. Genitourinary: Negative for difficulty urinating, dysuria, hematuria and urgency. Musculoskeletal: Positive for neck pain. Negative for back pain and neck stiffness. Skin: Positive for wound. Negative for color change. Allergic/Immunologic: Negative for immunocompromised state. Neurological: Negative for dizziness, seizures, syncope, weakness, light-headedness, numbness and headaches. Psychiatric/Behavioral: Negative for behavioral problems, confusion, hallucinations, self-injury and suicidal ideas. Vitals:    06/13/21 1421 06/13/21 1422   BP: 123/80    Pulse: 98    Resp: 18    SpO2: (!) 88% 93%            Physical Exam  Vitals and nursing note reviewed. Constitutional:       General: He is not in acute distress. Appearance: He is well-developed. He is not diaphoretic. HENT:      Head: Normocephalic.      Eyes:      Pupils: Pupils are equal, round, and reactive to light. Cardiovascular:      Rate and Rhythm: Normal rate and regular rhythm. Heart sounds: Normal heart sounds. No murmur heard. No friction rub. No gallop. Pulmonary:      Effort: Pulmonary effort is normal. No respiratory distress. Breath sounds: Wheezing present. Abdominal:      General: Bowel sounds are normal. There is no distension. Palpations: Abdomen is soft. Tenderness: There is no abdominal tenderness. There is no guarding or rebound. Musculoskeletal:         General: Normal range of motion. Cervical back: Normal range of motion and neck supple. No edema, erythema, signs of trauma, rigidity, torticollis or crepitus. Pain with movement and muscular tenderness present. No spinous process tenderness. Normal range of motion. Skin:     General: Skin is warm. Findings: No rash. Neurological:      Mental Status: He is alert and oriented to person, place, and time. Psychiatric:         Behavior: Behavior normal.         Thought Content: Thought content normal.         Judgment: Judgment normal.          MDM  Number of Diagnoses or Management Options  Opiate overdose, undetermined intent, initial encounter ([facility] [address]  Wheezing  Diagnosis management comments: Total critical care time spent exclusive of procedures:  44min       This is a 53-year-old male with past medical history, review of systems, physical exam as above, presenting via EMS for suspected drug overdose. Per report EMS was called for the unconscious male, finding the patient lying on the ground next to his pickup truck at a sporting event. Patient received IV fluids and IV Narcan, with resumption of mental status. Upon arrival he is awake and alert, denies ingestion or intoxication, noted to have pinpoint pupils, diffuse wheezing throughout, without tachycardia or fever. He endorses a history of asthma, rescue inhaler only without other controller.   He is noted to be sluggish, continues to fall asleep during interview and exam, with hypoxia. He has abrasions over the left forehead and left zygoma. Plan to supplement oxygen, stacked nebulizers, provide additional Narcan. Will obtain CMP, CBC, ABG, head and cervical spine CTs, chest x-ray, blood alcohol, UDS, acetaminophen and salicylate levels. We will reassess, and make a disposition. Procedures    4:34 PM  Patient approximately 2 hours since last dose of naloxone, remains in no acute distress, without hypoxia, pulmonary exam improved without wheezing, chest x-ray without acute process, lab work without acute abnormality. Patient refusing CT imaging and refusing to provide urine sample. Will discharge home with steroid burst for wheezing, and Narcan for further narcotic overdose, patient counseled for abstention, primary care follow-up, mental health resources provided.

## 2021-06-13 NOTE — ED NOTES
Patient is refusing CT at this time. States he cannot afford it, notified patient that there are resources that he can apply for to potentially have it covered if he qualifies, patient states this is not concrete and therefore refuses to get CT due to inability to pay if he does not get approved. Notified patient that CT is medically recommended since he passed out and hit his head and may have a potential head bleed that could be life threatening, patient says he understands and still refuses CT at this time. Dr. Linda Merino notified.

## 2021-06-13 NOTE — ED NOTES
Patient states is unable to give a urine sample at this time. Asked patient to try, and he says \" I know I cannot go\". Also pt denies giving any blood in order for respiratory to do an arterial blood gas. He is ok with getting the nebulizer treatment.

## 2021-12-24 ENCOUNTER — APPOINTMENT (OUTPATIENT)
Dept: GENERAL RADIOLOGY | Age: 46
End: 2021-12-24
Attending: PHYSICIAN ASSISTANT
Payer: COMMERCIAL

## 2021-12-24 ENCOUNTER — HOSPITAL ENCOUNTER (EMERGENCY)
Age: 46
Discharge: HOME | End: 2021-12-24
Attending: EMERGENCY MEDICINE
Payer: COMMERCIAL

## 2021-12-24 VITALS
RESPIRATION RATE: 16 BRPM | HEIGHT: 70 IN | DIASTOLIC BLOOD PRESSURE: 68 MMHG | OXYGEN SATURATION: 97 % | SYSTOLIC BLOOD PRESSURE: 111 MMHG | TEMPERATURE: 98.7 F | BODY MASS INDEX: 22.19 KG/M2 | HEART RATE: 66 BPM | WEIGHT: 155 LBS
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
## 2021-12-24 DIAGNOSIS — S81.852A DOG BITE OF LEFT LOWER LEG, INITIAL ENCOUNTER: Primary | ICD-10-CM

## 2021-12-24 PROCEDURE — 74011250636 HC RX REV CODE- 250/636: Performed by: PHYSICIAN ASSISTANT

## 2021-12-24 PROCEDURE — 96372 THER/PROPH/DIAG INJ SC/IM: CPT

## 2021-12-24 PROCEDURE — 90471 IMMUNIZATION ADMIN: CPT

## 2021-12-24 PROCEDURE — 73590 X-RAY EXAM OF LOWER LEG: CPT

## 2021-12-24 PROCEDURE — 74011000250 HC RX REV CODE- 250: Performed by: PHYSICIAN ASSISTANT

## 2021-12-24 PROCEDURE — 74011250637 HC RX REV CODE- 250/637: Performed by: PHYSICIAN ASSISTANT

## 2021-12-24 PROCEDURE — 99282 EMERGENCY DEPT VISIT SF MDM: CPT

## 2021-12-24 PROCEDURE — 90715 TDAP VACCINE 7 YRS/> IM: CPT | Performed by: PHYSICIAN ASSISTANT

## 2021-12-24 RX ORDER — AMOXICILLIN AND CLAVULANATE POTASSIUM 875; 125 MG/1; MG/1
1 TABLET, FILM COATED ORAL 2 TIMES DAILY
Qty: 14 TABLET | Refills: 0 | Status: SHIPPED | OUTPATIENT
Start: 2021-12-24 | End: 2021-12-31

## 2021-12-24 RX ORDER — BACITRACIN 500 UNIT/G
1 PACKET (EA) TOPICAL
Status: COMPLETED | OUTPATIENT
Start: 2021-12-24 | End: 2021-12-24

## 2021-12-24 RX ORDER — AMOXICILLIN AND CLAVULANATE POTASSIUM 875; 125 MG/1; MG/1
1 TABLET, FILM COATED ORAL
Status: COMPLETED | OUTPATIENT
Start: 2021-12-24 | End: 2021-12-24

## 2021-12-24 RX ADMIN — BACITRACIN 1 PACKET: 500 OINTMENT TOPICAL at 19:56

## 2021-12-24 RX ADMIN — TETANUS TOXOID, REDUCED DIPHTHERIA TOXOID AND ACELLULAR PERTUSSIS VACCINE, ADSORBED 0.5 ML: 5; 2.5; 8; 8; 2.5 SUSPENSION INTRAMUSCULAR at 19:56

## 2021-12-24 RX ADMIN — AMOXICILLIN AND CLAVULANATE POTASSIUM 1 TABLET: 875; 125 TABLET, FILM COATED ORAL at 19:56

## 2021-12-24 NOTE — Clinical Note
[address]  [facility] EMERGENCY DEPT  [address] [id number] [phone]    Work/School Note    Date: 12/24/2021    To Whom It May concern:    Maribell Gan was seen and treated today in the emergency room by the following provider(s):  Attending Provider: Bruno Corey MD  Physician Assistant: Linward Brunner, PA-C. Maribell Gan is excused from work/school on 12/24/2021 through 12/26/2021. He is medically clear to return to work/school on 12/27/2021.          Sincerely,          Joni Alonso PA-C

## 2021-12-25 NOTE — ED PROVIDER NOTES
46yo male with no signficant PMH was dropping off presents at a friend's ex wife's house in [facility] [place] who presents with dog bite to the left tib/fib around 4pm today. He states the pit bull dog bit his leg. He can ambulate, no fever, chills, numbness, tingling or weakness. unk last tetanus. States the dog's rabies was updated in 2021. Past Medical History:   Diagnosis Date    ADD (attention deficit disorder)     Asthma     Depression        Past Surgical History:   Procedure Laterality Date    HX ORTHOPAEDIC      Elbow    HX TONSILLECTOMY               Family History:   Problem Relation Age of Onset    OSTEOARTHRITIS Maternal Grandmother     Heart Disease Maternal Grandmother         CABG/Stents    Cancer Maternal Grandfather         Unknown       Social History     Socioeconomic History    Marital status: SINGLE     Spouse name: Not on file    Number of children: 0    Years of education: 13    Highest education level: Not on file   Occupational History    Occupation: sale     Comment: [address]   Tobacco Use    Smoking status: Former Smoker     Types: Cigarettes     Quit date: 2013     Years since quittin.4    Smokeless tobacco: Never Used   Substance and Sexual Activity    Alcohol use: No    Drug use: Yes     Types: Methamphetamines, Heroin    Sexual activity: Not Currently     Partners: Female   Other Topics Concern    Not on file   Social History Narrative    Not on file     Social Determinants of Health     Financial Resource Strain:     Difficulty of Paying Living Expenses: Not on file   Food Insecurity:     Worried About Running Out of Food in the Last Year: Not on file    Pawan of Food in the Last Year: Not on file   Transportation Needs:     Lack of Transportation (Medical): Not on file    Lack of Transportation (Non-Medical):  Not on file   Physical Activity:     Days of Exercise per Week: Not on file    Minutes of Exercise per Session: Not on file   Stress:     Feeling of Stress : Not on file   Social Connections:     Frequency of Communication with Friends and Family: Not on file    Frequency of Social Gatherings with Friends and Family: Not on file    Attends Muslim Services: Not on file    Active Member of Clubs or Organizations: Not on file    Attends Club or Organization Meetings: Not on file    Marital Status: Not on file   Intimate Partner Violence:     Fear of Current or Ex-Partner: Not on file    Emotionally Abused: Not on file    Physically Abused: Not on file    Sexually Abused: Not on file   Housing Stability:     Unable to Pay for Housing in the Last Year: Not on file    Number of [place] in the Last Year: Not on file    Unstable Housing in the Last Year: Not on file         ALLERGIES: Patient has no known allergies. Review of Systems   Constitutional: Negative. Negative for activity change, chills, fatigue and unexpected weight change. HENT: Negative for trouble swallowing. Respiratory: Negative for cough, chest tightness, shortness of breath and wheezing. Cardiovascular: Negative. Negative for chest pain and palpitations. Gastrointestinal: Negative. Negative for abdominal pain, diarrhea, nausea and vomiting. Genitourinary: Negative. Negative for dysuria, flank pain, frequency and hematuria. Musculoskeletal: Negative. Negative for arthralgias, back pain, neck pain and neck stiffness. Skin: Positive for wound. Negative for color change and rash. Neurological: Negative. Negative for dizziness, numbness and headaches. All other systems reviewed and are negative. Vitals:    12/24/21 1915   BP: 111/68   Pulse: 66   Resp: 16   Temp: 98.7 °F (37.1 °C)   SpO2: 97%   Weight: 70.3 kg (155 lb)   Height: 5' 10\" (1.778 m)            Physical Exam  Vitals and nursing note reviewed. Constitutional:       General: He is not in acute distress. Appearance: He is well-developed.  He is not toxic-appearing or diaphoretic. Comments: WM   HENT:      Head: Normocephalic and atraumatic. Eyes:      Pupils: Pupils are equal, round, and reactive to light. Neck:      Trachea: No tracheal tenderness. Cardiovascular:      Rate and Rhythm: Normal rate and regular rhythm. Pulses: Normal pulses. Heart sounds: Normal heart sounds. Pulmonary:      Effort: Pulmonary effort is normal. No respiratory distress. Breath sounds: Normal breath sounds. Abdominal:      General: Bowel sounds are normal. There is no distension. Palpations: Abdomen is soft. Musculoskeletal:         General: No tenderness. Normal range of motion. Cervical back: Full passive range of motion without pain and normal range of motion. Comments: L mid-anterior shin with C-shaped avulsion, no active hemorrhage; mild localized soft tissue swelling with subcutaneous emphysema to superior portion of wound. Skin:     General: Skin is warm and dry. Capillary Refill: Capillary refill takes less than 2 seconds. Findings: No abrasion, erythema or rash. Neurological:      General: No focal deficit present. Mental Status: He is alert and oriented to person, place, and time. Cranial Nerves: No cranial nerve deficit. Sensory: No sensory deficit. Coordination: Coordination normal.   Psychiatric:         Speech: Speech normal.         Behavior: Behavior normal.          MDM  Number of Diagnoses or Management Options  Diagnosis management comments:   Ddx: dog bite, FB       Amount and/or Complexity of Data Reviewed  Tests in the radiology section of CPT®: ordered and reviewed  Review and summarize past medical records: yes  Discuss the patient with other providers: yes    Patient Progress  Patient progress: stable         Procedures    I discussed patient's PMH, exam findings as well as careplan with the ER attending who agrees with care plan.   Cheikh Randle PA-C     Dog bite, will irrigate, start on Augmentin. Wound is well-approximated. Will contact animal control, apply pressure dressing with ACE wrap, discussed ice and extremity elevation and sx's of infection to watch for and return precautions discussed. DISCHARGE NOTE:  The patient has been re-evaluated and feeling much better and are stable for discharge. All available radiology and laboratory results have been reviewed with patient and/or available family. Patient and/or family verbally conveyed their understanding and agreement of the patient's signs, symptoms, diagnosis, treatment and prognosis and additionally agree to follow-up as recommended in the discharge instructions or to return to the Emergency Department should their condition change or worsen prior to their follow-up appointment. All questions have been answered and patient and/or available family express understanding. IMAGING RESULTS:  XR TIB/FIB LT    Result Date: 12/24/2021  No acute abnormality. MEDICATIONS GIVEN:  Medications   diph,Pertuss(AC),Tet Vac-PF (BOOSTRIX) suspension 0.5 mL (0.5 mL IntraMUSCular Given 12/24/21 1956)   amoxicillin-clavulanate (AUGMENTIN) 875-125 mg per tablet 1 Tablet (1 Tablet Oral Given 12/24/21 1956)   bacitracin 500 unit/gram packet 1 Packet (1 Packet Topical Given 12/24/21 1956)       IMPRESSION:  1. Dog bite of left lower leg, initial encounter        PLAN:  Follow-up Information     Follow up With Specialties Details Why Contact Info    [facility], Pcp  Schedule an appointment as soon as possible for a visit in 3 days as needed for follow-up. [address]          Discharge Medication List as of 12/24/2021  8:12 PM      START taking these medications    Details   amoxicillin-clavulanate (Augmentin) 875-125 mg per tablet Take 1 Tablet by mouth two (2) times a day for 7 days. First dose given in ER on 12/24/21.  Start on 12/25/21., Print, Disp-14 Tablet, R-0         CONTINUE these medications which have NOT CHANGED    Details   !! naloxone (Narcan) 4 mg/actuation nasal spray Use 1 spray intranasally, then discard. Repeat with new spray every 2 min as needed for opioid overdose symptoms, alternating nostrils, call 911, Print, Disp-1 Each, R-0      polyethylene glycol (MIRALAX) 17 gram/dose powder Take 17 g by mouth two (2) times a day. 1 tablespoon with 8 oz of water daily, Print, Disp-120 g, R-1      !! naloxone (NARCAN) 4 mg/actuation nasal spray Use 1 spray intranasally, then discard. Repeat with new spray every 2 min as needed for opioid overdose symptoms, alternating nostrils. , Print, Disp-2 Each, R-2      fluticasone propionate (FLONASE) 50 mcg/actuation nasal spray 2 sprays in each nostril daily, Print, Disp-1 Bottle, R-1      guaiFENesin ER (MUCINEX) 1,200 mg Ta12 ER tablet Take 1 Tab by mouth every twelve (12) hours. , Print, Disp-60 Tab, R-0      QUEtiapine (SEROQUEL) 25 mg tablet Take 1 Tab by mouth daily (after breakfast). , Print, Disp-30 Tab, R-0      escitalopram oxalate (LEXAPRO) 20 mg tablet Take 20 mg by mouth daily. , Historical Med      diphenhydrAMINE (BENADRYL) 25 mg tablet Take 25 mg by mouth every six (6) hours as needed for Sleep (allergies). , Historical Med      albuterol (PROVENTIL HFA, VENTOLIN HFA, PROAIR HFA) 90 mcg/actuation inhaler Take 2 Puffs by inhalation every four (4) hours as needed for Wheezing or Shortness of Breath., Print, Disp-1 Inhaler, R-0      cetirizine (ZYRTEC) 10 mg tablet Take 10 mg by mouth daily as needed., Historical Med       !! - Potential duplicate medications found. Please discuss with provider.

## 2021-12-25 NOTE — ED TRIAGE NOTES
Patient has bitten by a dog today at around 4pm today. The skin injury noted to the left leg, bleeding controlled.

## 2021-12-29 NOTE — PROGRESS NOTES
12/29/2021  2:01 PM  Case management note    [facility] occurred on 12/24/2021. She has reached out to patient and dog owner. She is under impression that owner gave the information regarding rabies shots on dog. I called patient and he let me know he saw rabies papers for the dog. He got a tetanus shot but felt he did not need rabies series. If patient found out any different information, told him to reach out to me if needed to set up rabies series.   Samantha Sage

## 2022-03-19 PROBLEM — K56.609 SMALL BOWEL OBSTRUCTION (HCC): Status: ACTIVE | Noted: 2019-11-12

## 2022-03-19 PROBLEM — A41.9 SEPSIS (HCC): Status: ACTIVE | Noted: 2019-11-12

## 2022-06-20 NOTE — ED NOTES
Blood gluocse 57. Asymptomatic. Took medications well. Brief changed saturated with urine and cleansed of moderate amount of soft brown stool. 0200 blood gluose 77  0600 brief checked and dry @ present.   Forensic restraints in place  Skin & circulation checks done Q2 hours Yosvany Bryant initiated for hypothermia

## 2023-01-13 ENCOUNTER — APPOINTMENT (OUTPATIENT)
Dept: GENERAL RADIOLOGY | Age: 48
End: 2023-01-13
Attending: EMERGENCY MEDICINE
Payer: MEDICAID

## 2023-01-13 ENCOUNTER — HOSPITAL ENCOUNTER (EMERGENCY)
Age: 48
Discharge: HOME | End: 2023-01-13
Attending: EMERGENCY MEDICINE
Payer: MEDICAID

## 2023-01-13 VITALS
RESPIRATION RATE: 18 BRPM | WEIGHT: 156 LBS | TEMPERATURE: 99.1 F | OXYGEN SATURATION: 96 % | BODY MASS INDEX: 23.64 KG/M2 | HEIGHT: 68 IN | HEART RATE: 86 BPM | SYSTOLIC BLOOD PRESSURE: 105 MMHG | DIASTOLIC BLOOD PRESSURE: 78 MMHG

## 2023-01-13 DIAGNOSIS — T40.2X1A OPIOID OVERDOSE, ACCIDENTAL OR UNINTENTIONAL, INITIAL ENCOUNTER (HCC): ICD-10-CM

## 2023-01-13 DIAGNOSIS — F19.10 POLYSUBSTANCE ABUSE (HCC): Primary | ICD-10-CM

## 2023-01-13 LAB
ALBUMIN SERPL-MCNC: 3.6 G/DL (ref 3.5–5)
ALBUMIN/GLOB SERPL: 1 (ref 1.1–2.2)
ALP SERPL-CCNC: 86 U/L (ref 45–117)
ALT SERPL-CCNC: 51 U/L (ref 12–78)
AMPHET UR QL SCN: POSITIVE
ANION GAP SERPL CALC-SCNC: 5 MMOL/L (ref 5–15)
APAP SERPL-MCNC: <2 UG/ML (ref 10–30)
APPEARANCE UR: CLEAR
AST SERPL-CCNC: 55 U/L (ref 15–37)
BACTERIA URNS QL MICRO: NEGATIVE /HPF
BARBITURATES UR QL SCN: NEGATIVE
BASOPHILS # BLD: 0.1 K/UL (ref 0–0.1)
BASOPHILS NFR BLD: 1 % (ref 0–1)
BENZODIAZ UR QL: POSITIVE
BILIRUB SERPL-MCNC: 0.4 MG/DL (ref 0.2–1)
BILIRUB UR QL: NEGATIVE
BUN SERPL-MCNC: 14 MG/DL (ref 6–20)
BUN/CREAT SERPL: 13 (ref 12–20)
CALCIUM SERPL-MCNC: 9.2 MG/DL (ref 8.5–10.1)
CANNABINOIDS UR QL SCN: POSITIVE
CHLORIDE SERPL-SCNC: 106 MMOL/L (ref 97–108)
CO2 SERPL-SCNC: 27 MMOL/L (ref 21–32)
COCAINE UR QL SCN: POSITIVE
COLOR UR: ABNORMAL
COMMENT, HOLDF: NORMAL
CREAT SERPL-MCNC: 1.09 MG/DL (ref 0.7–1.3)
DIFFERENTIAL METHOD BLD: ABNORMAL
DRUG SCRN COMMENT,DRGCM: ABNORMAL
EOSINOPHIL # BLD: 0.4 K/UL (ref 0–0.4)
EOSINOPHIL NFR BLD: 2 % (ref 0–7)
EPITH CASTS URNS QL MICRO: ABNORMAL /LPF
ERYTHROCYTE [DISTWIDTH] IN BLOOD BY AUTOMATED COUNT: 13.5 % (ref 11.5–14.5)
ETHANOL SERPL-MCNC: <10 MG/DL
GLOBULIN SER CALC-MCNC: 3.6 G/DL (ref 2–4)
GLUCOSE SERPL-MCNC: 97 MG/DL (ref 65–100)
GLUCOSE UR STRIP.AUTO-MCNC: NEGATIVE MG/DL
HCT VFR BLD AUTO: 42 % (ref 36.6–50.3)
HGB BLD-MCNC: 14.2 G/DL (ref 12.1–17)
HGB UR QL STRIP: ABNORMAL
IMM GRANULOCYTES # BLD AUTO: 0.1 K/UL (ref 0–0.04)
IMM GRANULOCYTES NFR BLD AUTO: 1 % (ref 0–0.5)
KETONES UR QL STRIP.AUTO: ABNORMAL MG/DL
LEUKOCYTE ESTERASE UR QL STRIP.AUTO: NEGATIVE
LYMPHOCYTES # BLD: 1.6 K/UL (ref 0.8–3.5)
LYMPHOCYTES NFR BLD: 10 % (ref 12–49)
MAGNESIUM SERPL-MCNC: 2.3 MG/DL (ref 1.6–2.4)
MCH RBC QN AUTO: 31.2 PG (ref 26–34)
MCHC RBC AUTO-ENTMCNC: 33.8 G/DL (ref 30–36.5)
MCV RBC AUTO: 92.3 FL (ref 80–99)
METHADONE UR QL: NEGATIVE
MONOCYTES # BLD: 1.1 K/UL (ref 0–1)
MONOCYTES NFR BLD: 7 % (ref 5–13)
NEUTS SEG # BLD: 13.6 K/UL (ref 1.8–8)
NEUTS SEG NFR BLD: 79 % (ref 32–75)
NITRITE UR QL STRIP.AUTO: NEGATIVE
NRBC # BLD: 0 K/UL (ref 0–0.01)
NRBC BLD-RTO: 0 PER 100 WBC
OPIATES UR QL: POSITIVE
PCP UR QL: NEGATIVE
PH UR STRIP: 5 (ref 5–8)
PLATELET # BLD AUTO: 319 K/UL (ref 150–400)
PMV BLD AUTO: 9.3 FL (ref 8.9–12.9)
POTASSIUM SERPL-SCNC: 3.6 MMOL/L (ref 3.5–5.1)
PROT SERPL-MCNC: 7.2 G/DL (ref 6.4–8.2)
PROT UR STRIP-MCNC: ABNORMAL MG/DL
RBC # BLD AUTO: 4.55 M/UL (ref 4.1–5.7)
RBC #/AREA URNS HPF: ABNORMAL /HPF (ref 0–5)
SALICYLATES SERPL-MCNC: <1.7 MG/DL (ref 2.8–20)
SAMPLES BEING HELD,HOLD: NORMAL
SODIUM SERPL-SCNC: 138 MMOL/L (ref 136–145)
SP GR UR REFRACTOMETRY: 1.03 (ref 1–1.03)
UR CULT HOLD, URHOLD: NORMAL
UROBILINOGEN UR QL STRIP.AUTO: 1 EU/DL (ref 0.2–1)
WBC # BLD AUTO: 17 K/UL (ref 4.1–11.1)
WBC URNS QL MICRO: ABNORMAL /HPF (ref 0–4)

## 2023-01-13 PROCEDURE — 36415 COLL VENOUS BLD VENIPUNCTURE: CPT

## 2023-01-13 PROCEDURE — 80179 DRUG ASSAY SALICYLATE: CPT

## 2023-01-13 PROCEDURE — 96360 HYDRATION IV INFUSION INIT: CPT

## 2023-01-13 PROCEDURE — 82077 ASSAY SPEC XCP UR&BREATH IA: CPT

## 2023-01-13 PROCEDURE — 93005 ELECTROCARDIOGRAM TRACING: CPT

## 2023-01-13 PROCEDURE — 81001 URINALYSIS AUTO W/SCOPE: CPT

## 2023-01-13 PROCEDURE — 71045 X-RAY EXAM CHEST 1 VIEW: CPT

## 2023-01-13 PROCEDURE — 85025 COMPLETE CBC W/AUTO DIFF WBC: CPT

## 2023-01-13 PROCEDURE — 80053 COMPREHEN METABOLIC PANEL: CPT

## 2023-01-13 PROCEDURE — 80143 DRUG ASSAY ACETAMINOPHEN: CPT

## 2023-01-13 PROCEDURE — 99285 EMERGENCY DEPT VISIT HI MDM: CPT

## 2023-01-13 PROCEDURE — 83735 ASSAY OF MAGNESIUM: CPT

## 2023-01-13 PROCEDURE — 80307 DRUG TEST PRSMV CHEM ANLYZR: CPT

## 2023-01-13 PROCEDURE — 74011250636 HC RX REV CODE- 250/636: Performed by: EMERGENCY MEDICINE

## 2023-01-13 RX ADMIN — SODIUM CHLORIDE 1000 ML: 9 INJECTION, SOLUTION INTRAVENOUS at 11:12

## 2023-01-13 NOTE — ED TRIAGE NOTES
Patient arrives to ed via EMS after being found in his car in a random neighborhood by his mother. Ems reported per bystanders pt had been doing heroin the night before and had received narcan by bystanders and EMS. Patient arrived covered in feces, arousable by voice and painful stimuli. Pt follows commands after being told repeatedly. Pt maintaining airway and O2 sats without oxygen.

## 2023-01-13 NOTE — ED NOTES
Patient does not appear to be in any acute distress/shows no evidence of clinical instability at this time. Provider has reviewed discharge instructions with the patient. The patient verbalized understanding instructions as well as need for follow up for any further symptoms. Discharge papers given, education provided, and any questions answered. Patient discharged by provider. Pt also provided with a methadone clinic contact number and address.

## 2023-01-13 NOTE — ED PROVIDER NOTES
49-year-old male brought in by EMS with altered mental status. According to EMS, patient has been calling and texting his mother throughout the night about doing drugs and his location. She found him this morning in his car passed out and mostly unresponsive. She called 911. EMS states that the patient would groan and moan in response to voice or noxious stimuli. He was initially satting about 87% on room air. According to witnesses on scene, he had been given Narcan multiple times throughout the night. EMS administered 2 mg of intranasal Narcan upon arrival to the emergency department. His blood sugar on scene was reported to be 77. According to EMS, the patient does have a history of heroin abuse.        Past Medical History:   Diagnosis Date    ADD (attention deficit disorder)     Asthma     Depression        Past Surgical History:   Procedure Laterality Date    HX ORTHOPAEDIC      Elbow    HX TONSILLECTOMY               Family History:   Problem Relation Age of Onset    OSTEOARTHRITIS Maternal Grandmother     Heart Disease Maternal Grandmother         CABG/Stents    Cancer Maternal Grandfather         Unknown       Social History     Socioeconomic History    Marital status: SINGLE     Spouse name: Not on file    Number of children: 0    Years of education: 15    Highest education level: Not on file   Occupational History    Occupation: sale     Comment: [address]   Tobacco Use    Smoking status: Former     Types: Cigarettes     Quit date: 2013     Years since quittin.4    Smokeless tobacco: Never   Substance and Sexual Activity    Alcohol use: No    Drug use: Yes     Types: Methamphetamines, Heroin    Sexual activity: Not Currently     Partners: Female   Other Topics Concern    Not on file   Social History Narrative    Not on file     Social Determinants of Health     Financial Resource Strain: Not on file   Food Insecurity: Not on file   Transportation Needs: Not on file   Physical Activity: Not on file   Stress: Not on file   Social Connections: Not on file   Intimate Partner Violence: Not on file   Housing Stability: Not on file         ALLERGIES: Patient has no known allergies. Review of Systems   Unable to perform ROS: Mental status change     Vitals:    01/13/23 1040 01/13/23 1103   BP: 138/79    Pulse: 97 69   Resp: 16    Temp: 98.9 °F (37.2 °C)    SpO2: 99%    Weight: 70.8 kg (156 lb)    Height: 5' 8\" (1.727 m)             Physical Exam  Vitals and nursing note reviewed. Constitutional:       General: He is not in acute distress. Appearance: He is well-developed. He is not ill-appearing or toxic-appearing. Comments: Patient curled up in a fetal position on his right side covered in stool   HENT:      Head: Normocephalic and atraumatic. Mouth/Throat:      Mouth: Mucous membranes are dry. Eyes:      Pupils: Pupils are equal, round, and reactive to light. Cardiovascular:      Rate and Rhythm: Normal rate. Pulmonary:      Effort: Pulmonary effort is normal. No respiratory distress. Abdominal:      General: There is no distension. Musculoskeletal:         General: Normal range of motion. Cervical back: Normal range of motion and neck supple. Skin:     General: Skin is warm and dry. Neurological:      Mental Status: He is oriented to person, place, and time. Medical Decision Making  Assessment: Patient arrives with altered mental status. Suspected drug overdose. No obvious evidence of trauma on exam.  Vital signs are unremarkable. Patient soiled with stool and urine. On review of the patient's EMR, he has multiple previous ED visits for polysubstance abuse and drug overdoses. Unknown intent at this time. Plan to obtain blood work, urine, aspirin and Tylenol levels, alcohol level. Will give IV fluids monitor. Reassess after results of diagnostics. Amount and/or Complexity of Data Reviewed  Labs: ordered. Radiology: ordered.       ED Course as of 01/13/23 1344   Fri Jan 13, 2023   1053 ED EKG interpretation:  Rhythm: normal sinus rhythm. Rate (approx.): 66.  Axis: normal.  ST segment:  No concerning ST elevations or depressions. This EKG was interpreted by Cydney Castrejon MD,ED Provider. [JM]   1329 Patient resting comfortably in the stretcher. More responsive and alert at this time. Vital signs stable. His work-up was unremarkable except for a pan positive urine drug screen. Chest x-ray was clear. Stable for discharge home once he is sober enough to walk and steady on his feet.  [JM]      ED Course User Index  [JM] Cierra Perez MD       Procedures

## 2023-01-14 LAB
ATRIAL RATE: 66 BPM
CALCULATED P AXIS, ECG09: 60 DEGREES
CALCULATED R AXIS, ECG10: 72 DEGREES
CALCULATED T AXIS, ECG11: 67 DEGREES
DIAGNOSIS, 93000: NORMAL
P-R INTERVAL, ECG05: 148 MS
Q-T INTERVAL, ECG07: 384 MS
QRS DURATION, ECG06: 84 MS
QTC CALCULATION (BEZET), ECG08: 402 MS
VENTRICULAR RATE, ECG03: 66 BPM

## 2023-05-21 RX ORDER — QUETIAPINE FUMARATE 25 MG/1
25 TABLET, FILM COATED ORAL
COMMUNITY
Start: 2019-11-16

## 2023-05-21 RX ORDER — ALBUTEROL SULFATE 90 UG/1
2 AEROSOL, METERED RESPIRATORY (INHALATION) EVERY 4 HOURS PRN
COMMUNITY
Start: 2015-05-12

## 2023-05-21 RX ORDER — ESCITALOPRAM OXALATE 20 MG/1
20 TABLET ORAL DAILY
COMMUNITY

## 2023-05-21 RX ORDER — FLUTICASONE PROPIONATE 50 MCG
SPRAY, SUSPENSION (ML) NASAL
COMMUNITY
Start: 2019-11-16

## 2023-05-21 RX ORDER — GUAIFENESIN 1200 MG/1
1200 TABLET, EXTENDED RELEASE ORAL EVERY 12 HOURS
COMMUNITY
Start: 2019-11-16

## 2023-05-21 RX ORDER — NALOXONE HYDROCHLORIDE 4 MG/.1ML
SPRAY NASAL
COMMUNITY
Start: 2020-01-17

## 2023-05-21 RX ORDER — POLYETHYLENE GLYCOL 3350 17 G/17G
17 POWDER, FOR SOLUTION ORAL 2 TIMES DAILY
COMMUNITY
Start: 2020-01-17

## 2023-05-21 RX ORDER — CETIRIZINE HYDROCHLORIDE 10 MG/1
10 TABLET ORAL DAILY PRN
COMMUNITY

## 2024-05-17 ENCOUNTER — HOSPITAL ENCOUNTER (EMERGENCY)
Facility: HOSPITAL | Age: 49
Discharge: LEFT BEFORE DISCHARGE | End: 2024-05-17

## 2024-05-17 ENCOUNTER — HOSPITAL ENCOUNTER (EMERGENCY)
Facility: HOSPITAL | Age: 49
Discharge: HOME | End: 2024-05-17
Payer: MEDICAID

## 2024-05-17 ENCOUNTER — APPOINTMENT (OUTPATIENT)
Facility: HOSPITAL | Age: 49
End: 2024-05-17
Payer: MEDICAID

## 2024-05-17 VITALS
WEIGHT: 160 LBS | TEMPERATURE: 98.5 F | BODY MASS INDEX: 22.9 KG/M2 | SYSTOLIC BLOOD PRESSURE: 115 MMHG | OXYGEN SATURATION: 96 % | HEIGHT: 70 IN | RESPIRATION RATE: 18 BRPM | DIASTOLIC BLOOD PRESSURE: 75 MMHG | HEART RATE: 74 BPM

## 2024-05-17 VITALS
DIASTOLIC BLOOD PRESSURE: 78 MMHG | OXYGEN SATURATION: 98 % | SYSTOLIC BLOOD PRESSURE: 121 MMHG | WEIGHT: 166.23 LBS | HEART RATE: 65 BPM | TEMPERATURE: 97.9 F | BODY MASS INDEX: 23.8 KG/M2 | RESPIRATION RATE: 19 BRPM | HEIGHT: 70 IN

## 2024-05-17 LAB
ALBUMIN SERPL-MCNC: 3.8 G/DL (ref 3.5–5)
ALBUMIN/GLOB SERPL: 1 (ref 1.1–2.2)
ALP SERPL-CCNC: 72 U/L (ref 45–117)
ALT SERPL-CCNC: 27 U/L (ref 12–78)
ANION GAP SERPL CALC-SCNC: 0 MMOL/L (ref 5–15)
AST SERPL-CCNC: 15 U/L (ref 15–37)
BASOPHILS # BLD: 0.1 K/UL (ref 0–0.1)
BASOPHILS NFR BLD: 1 % (ref 0–1)
BILIRUB SERPL-MCNC: 0.3 MG/DL (ref 0.2–1)
BUN SERPL-MCNC: 19 MG/DL (ref 6–20)
BUN/CREAT SERPL: 19 (ref 12–20)
CALCIUM SERPL-MCNC: 9.6 MG/DL (ref 8.5–10.1)
CHLORIDE SERPL-SCNC: 106 MMOL/L (ref 97–108)
CO2 SERPL-SCNC: 32 MMOL/L (ref 21–32)
COMMENT:: NORMAL
CREAT SERPL-MCNC: 1.02 MG/DL (ref 0.7–1.3)
DIFFERENTIAL METHOD BLD: ABNORMAL
EOSINOPHIL # BLD: 0.5 K/UL (ref 0–0.4)
EOSINOPHIL NFR BLD: 6 % (ref 0–7)
ERYTHROCYTE [DISTWIDTH] IN BLOOD BY AUTOMATED COUNT: 13.5 % (ref 11.5–14.5)
GLOBULIN SER CALC-MCNC: 3.8 G/DL (ref 2–4)
GLUCOSE SERPL-MCNC: 92 MG/DL (ref 65–100)
HCT VFR BLD AUTO: 40.9 % (ref 36.6–50.3)
HGB BLD-MCNC: 13.7 G/DL (ref 12.1–17)
IMM GRANULOCYTES # BLD AUTO: 0 K/UL (ref 0–0.04)
IMM GRANULOCYTES NFR BLD AUTO: 0 % (ref 0–0.5)
LYMPHOCYTES # BLD: 2.4 K/UL (ref 0.8–3.5)
LYMPHOCYTES NFR BLD: 29 % (ref 12–49)
MCH RBC QN AUTO: 30.4 PG (ref 26–34)
MCHC RBC AUTO-ENTMCNC: 33.5 G/DL (ref 30–36.5)
MCV RBC AUTO: 90.7 FL (ref 80–99)
MONOCYTES # BLD: 0.7 K/UL (ref 0–1)
MONOCYTES NFR BLD: 9 % (ref 5–13)
NEUTS SEG # BLD: 4.7 K/UL (ref 1.8–8)
NEUTS SEG NFR BLD: 55 % (ref 32–75)
NRBC # BLD: 0 K/UL (ref 0–0.01)
NRBC BLD-RTO: 0 PER 100 WBC
PLATELET # BLD AUTO: 287 K/UL (ref 150–400)
PMV BLD AUTO: 9.4 FL (ref 8.9–12.9)
POTASSIUM SERPL-SCNC: 4.3 MMOL/L (ref 3.5–5.1)
PROT SERPL-MCNC: 7.6 G/DL (ref 6.4–8.2)
RBC # BLD AUTO: 4.51 M/UL (ref 4.1–5.7)
SODIUM SERPL-SCNC: 138 MMOL/L (ref 136–145)
SPECIMEN HOLD: NORMAL
WBC # BLD AUTO: 8.5 K/UL (ref 4.1–11.1)

## 2024-05-17 PROCEDURE — 4500000002 HC ER NO CHARGE

## 2024-05-17 PROCEDURE — 80053 COMPREHEN METABOLIC PANEL: CPT

## 2024-05-17 PROCEDURE — 85025 COMPLETE CBC W/AUTO DIFF WBC: CPT

## 2024-05-17 PROCEDURE — 36415 COLL VENOUS BLD VENIPUNCTURE: CPT

## 2024-05-17 PROCEDURE — 71046 X-RAY EXAM CHEST 2 VIEWS: CPT

## 2024-05-17 ASSESSMENT — PAIN - FUNCTIONAL ASSESSMENT: PAIN_FUNCTIONAL_ASSESSMENT: NONE - DENIES PAIN

## 2024-05-18 NOTE — ED TRIAGE NOTES
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 Pt has some wheezing. Pt has not had an asthma attack since he was 10. Cara shrestha cp.

## 2024-05-21 LAB
EKG ATRIAL RATE: 69 BPM
EKG DIAGNOSIS: NORMAL
EKG P AXIS: 63 DEGREES
EKG P-R INTERVAL: 148 MS
EKG Q-T INTERVAL: 418 MS
EKG QRS DURATION: 86 MS
EKG QTC CALCULATION (BAZETT): 447 MS
EKG R AXIS: 62 DEGREES
EKG T AXIS: 74 DEGREES
EKG VENTRICULAR RATE: 69 BPM